# Patient Record
Sex: FEMALE | Race: WHITE | NOT HISPANIC OR LATINO | Employment: OTHER | URBAN - METROPOLITAN AREA
[De-identification: names, ages, dates, MRNs, and addresses within clinical notes are randomized per-mention and may not be internally consistent; named-entity substitution may affect disease eponyms.]

---

## 2023-05-18 DIAGNOSIS — I10 HYPERTENSION, UNSPECIFIED TYPE: ICD-10-CM

## 2023-05-18 RX ORDER — ROSUVASTATIN CALCIUM 40 MG/1
1 TABLET, COATED ORAL NIGHTLY
COMMUNITY
Start: 2020-07-13 | End: 2023-05-18 | Stop reason: SDUPTHER

## 2023-05-18 RX ORDER — SPIRONOLACTONE 25 MG/1
1 TABLET ORAL DAILY
COMMUNITY
Start: 2022-02-10 | End: 2023-05-18 | Stop reason: SDUPTHER

## 2023-05-18 RX ORDER — ROSUVASTATIN CALCIUM 40 MG/1
40 TABLET, COATED ORAL NIGHTLY
Qty: 30 TABLET | Refills: 0 | Status: SHIPPED | OUTPATIENT
Start: 2023-05-18

## 2023-05-18 RX ORDER — SPIRONOLACTONE 25 MG/1
25 TABLET ORAL DAILY
Qty: 30 TABLET | Refills: 0 | Status: SHIPPED | OUTPATIENT
Start: 2023-05-18

## 2023-06-17 LAB
ALANINE AMINOTRANSFERASE (SGPT) (U/L) IN SER/PLAS: 14 U/L (ref 7–45)
ALBUMIN (G/DL) IN SER/PLAS: 3.9 G/DL (ref 3.4–5)
ALKALINE PHOSPHATASE (U/L) IN SER/PLAS: 92 U/L (ref 33–136)
ANION GAP IN SER/PLAS: 13 MMOL/L (ref 10–20)
ASPARTATE AMINOTRANSFERASE (SGOT) (U/L) IN SER/PLAS: 18 U/L (ref 9–39)
BILIRUBIN TOTAL (MG/DL) IN SER/PLAS: 0.5 MG/DL (ref 0–1.2)
CALCIUM (MG/DL) IN SER/PLAS: 9.8 MG/DL (ref 8.6–10.6)
CARBON DIOXIDE, TOTAL (MMOL/L) IN SER/PLAS: 28 MMOL/L (ref 21–32)
CHLORIDE (MMOL/L) IN SER/PLAS: 108 MMOL/L (ref 98–107)
CHOLESTEROL (MG/DL) IN SER/PLAS: 194 MG/DL (ref 0–199)
CHOLESTEROL IN HDL (MG/DL) IN SER/PLAS: 49.1 MG/DL
CHOLESTEROL/HDL RATIO: 4
CREATININE (MG/DL) IN SER/PLAS: 0.94 MG/DL (ref 0.5–1.05)
ERYTHROCYTE DISTRIBUTION WIDTH (RATIO) BY AUTOMATED COUNT: 12.8 % (ref 11.5–14.5)
ERYTHROCYTE MEAN CORPUSCULAR HEMOGLOBIN CONCENTRATION (G/DL) BY AUTOMATED: 31.1 G/DL (ref 32–36)
ERYTHROCYTE MEAN CORPUSCULAR VOLUME (FL) BY AUTOMATED COUNT: 95 FL (ref 80–100)
ERYTHROCYTES (10*6/UL) IN BLOOD BY AUTOMATED COUNT: 4.05 X10E12/L (ref 4–5.2)
GFR FEMALE: 64 ML/MIN/1.73M2
GLUCOSE (MG/DL) IN SER/PLAS: 95 MG/DL (ref 74–99)
HEMATOCRIT (%) IN BLOOD BY AUTOMATED COUNT: 38.3 % (ref 36–46)
HEMOGLOBIN (G/DL) IN BLOOD: 11.9 G/DL (ref 12–16)
LDL: 125 MG/DL (ref 0–99)
LEUKOCYTES (10*3/UL) IN BLOOD BY AUTOMATED COUNT: 5.7 X10E9/L (ref 4.4–11.3)
NRBC (PER 100 WBCS) BY AUTOMATED COUNT: 0 /100 WBC (ref 0–0)
PLATELETS (10*3/UL) IN BLOOD AUTOMATED COUNT: 287 X10E9/L (ref 150–450)
POTASSIUM (MMOL/L) IN SER/PLAS: 5.6 MMOL/L (ref 3.5–5.3)
PROTEIN TOTAL: 6.8 G/DL (ref 6.4–8.2)
SODIUM (MMOL/L) IN SER/PLAS: 143 MMOL/L (ref 136–145)
THYROTROPIN (MIU/L) IN SER/PLAS BY DETECTION LIMIT <= 0.05 MIU/L: 3.98 MIU/L (ref 0.44–3.98)
TRIGLYCERIDE (MG/DL) IN SER/PLAS: 102 MG/DL (ref 0–149)
UREA NITROGEN (MG/DL) IN SER/PLAS: 18 MG/DL (ref 6–23)
VLDL: 20 MG/DL (ref 0–40)

## 2023-06-27 ENCOUNTER — TELEPHONE (OUTPATIENT)
Dept: PRIMARY CARE | Facility: CLINIC | Age: 73
End: 2023-06-27
Payer: MEDICARE

## 2023-06-27 NOTE — TELEPHONE ENCOUNTER
Please inform patient that we have a new provider joining our practice, that will be seeing Dr. Valerio's former patients.   Please see if patient would like to schedule an appointment with Dr. Kerns.   Let me know, Thank you!

## 2023-07-18 LAB
ALANINE AMINOTRANSFERASE (SGPT) (U/L) IN SER/PLAS: 14 U/L (ref 7–45)
ALBUMIN (G/DL) IN SER/PLAS: 3.9 G/DL (ref 3.4–5)
ALKALINE PHOSPHATASE (U/L) IN SER/PLAS: 107 U/L (ref 33–136)
ANION GAP IN SER/PLAS: 13 MMOL/L (ref 10–20)
ASPARTATE AMINOTRANSFERASE (SGOT) (U/L) IN SER/PLAS: 19 U/L (ref 9–39)
BILIRUBIN TOTAL (MG/DL) IN SER/PLAS: 0.7 MG/DL (ref 0–1.2)
CALCIUM (MG/DL) IN SER/PLAS: 9.7 MG/DL (ref 8.6–10.6)
CARBON DIOXIDE, TOTAL (MMOL/L) IN SER/PLAS: 28 MMOL/L (ref 21–32)
CHLORIDE (MMOL/L) IN SER/PLAS: 106 MMOL/L (ref 98–107)
CHOLESTEROL (MG/DL) IN SER/PLAS: 120 MG/DL (ref 0–199)
CHOLESTEROL IN HDL (MG/DL) IN SER/PLAS: 54.5 MG/DL
CHOLESTEROL/HDL RATIO: 2.2
CREATININE (MG/DL) IN SER/PLAS: 0.88 MG/DL (ref 0.5–1.05)
GFR FEMALE: 70 ML/MIN/1.73M2
GLUCOSE (MG/DL) IN SER/PLAS: 88 MG/DL (ref 74–99)
LDL: 44 MG/DL (ref 0–99)
POTASSIUM (MMOL/L) IN SER/PLAS: 5.1 MMOL/L (ref 3.5–5.3)
PROTEIN TOTAL: 6.9 G/DL (ref 6.4–8.2)
SODIUM (MMOL/L) IN SER/PLAS: 142 MMOL/L (ref 136–145)
TRIGLYCERIDE (MG/DL) IN SER/PLAS: 106 MG/DL (ref 0–149)
UREA NITROGEN (MG/DL) IN SER/PLAS: 18 MG/DL (ref 6–23)
VLDL: 21 MG/DL (ref 0–40)

## 2023-09-13 PROBLEM — N81.10 FEMALE CYSTOCELE: Status: ACTIVE | Noted: 2023-09-13

## 2023-09-13 PROBLEM — M51.34 THORACIC DEGENERATIVE DISC DISEASE: Status: ACTIVE | Noted: 2023-09-13

## 2023-09-13 PROBLEM — M85.80 OSTEOPENIA: Status: ACTIVE | Noted: 2023-09-13

## 2023-09-13 PROBLEM — M79.89 SWELLING OF LEFT LOWER EXTREMITY: Status: ACTIVE | Noted: 2023-09-13

## 2023-09-13 PROBLEM — H02.9 LESION OF RIGHT EYELID: Status: ACTIVE | Noted: 2023-09-13

## 2023-09-13 PROBLEM — N83.299 OVARIAN CYST, COMPLEX: Status: ACTIVE | Noted: 2023-09-13

## 2023-09-13 PROBLEM — M35.01 BILATERAL KERATITIS SICCA (MULTI): Status: ACTIVE | Noted: 2023-09-13

## 2023-09-13 PROBLEM — R06.09 DYSPNEA ON EXERTION: Status: ACTIVE | Noted: 2023-09-13

## 2023-09-13 PROBLEM — H25.11 AGE-RELATED NUCLEAR CATARACT OF RIGHT EYE: Status: ACTIVE | Noted: 2023-09-13

## 2023-09-13 PROBLEM — J98.6 DIAPHRAGMATIC PARALYSIS: Status: ACTIVE | Noted: 2023-09-13

## 2023-09-13 PROBLEM — R07.9 CHEST PAIN: Status: ACTIVE | Noted: 2023-09-13

## 2023-09-13 PROBLEM — H01.123: Status: ACTIVE | Noted: 2023-09-13

## 2023-09-13 PROBLEM — G56.02 LEFT CARPAL TUNNEL SYNDROME: Status: ACTIVE | Noted: 2023-09-13

## 2023-09-13 PROBLEM — I89.0 LYMPHEDEMA OF BOTH LOWER EXTREMITIES: Status: ACTIVE | Noted: 2023-09-13

## 2023-09-13 PROBLEM — M19.041 PRIMARY OSTEOARTHRITIS, RIGHT HAND: Status: ACTIVE | Noted: 2023-09-13

## 2023-09-13 PROBLEM — R55 SYNCOPE: Status: ACTIVE | Noted: 2023-09-13

## 2023-09-13 PROBLEM — K22.4 ESOPHAGEAL SPASM: Status: ACTIVE | Noted: 2023-09-13

## 2023-09-13 PROBLEM — I50.33 ACUTE ON CHRONIC DIASTOLIC HEART FAILURE (MULTI): Status: ACTIVE | Noted: 2023-09-13

## 2023-09-13 PROBLEM — E66.2 OBESITY HYPOVENTILATION SYNDROME (MULTI): Status: ACTIVE | Noted: 2023-09-13

## 2023-09-13 PROBLEM — K21.9 ESOPHAGEAL REFLUX: Status: ACTIVE | Noted: 2023-09-13

## 2023-09-13 PROBLEM — I51.89 DIASTOLIC DYSFUNCTION: Status: ACTIVE | Noted: 2023-09-13

## 2023-09-13 PROBLEM — Z94.7: Status: ACTIVE | Noted: 2023-09-13

## 2023-09-13 PROBLEM — G47.00 INSOMNIA: Status: ACTIVE | Noted: 2023-09-13

## 2023-09-13 PROBLEM — E55.9 VITAMIN D DEFICIENCY: Status: ACTIVE | Noted: 2023-09-13

## 2023-09-13 PROBLEM — J45.909 ASTHMA (HHS-HCC): Status: ACTIVE | Noted: 2023-09-13

## 2023-09-13 PROBLEM — M47.816 SPONDYLOSIS OF LUMBAR SPINE: Status: ACTIVE | Noted: 2023-09-13

## 2023-09-13 PROBLEM — N18.9 CHRONIC RENAL INSUFFICIENCY: Status: ACTIVE | Noted: 2023-09-13

## 2023-09-13 PROBLEM — H43.393 VITREOUS FLOATERS OF BOTH EYES: Status: ACTIVE | Noted: 2023-09-13

## 2023-09-13 PROBLEM — G56.01 RIGHT CARPAL TUNNEL SYNDROME: Status: ACTIVE | Noted: 2023-09-13

## 2023-09-13 PROBLEM — S33.5XXA LUMBAR BACK SPRAIN: Status: ACTIVE | Noted: 2023-09-13

## 2023-09-13 PROBLEM — N18.30 CHRONIC RENAL IMPAIRMENT, STAGE 3 (MODERATE) (MULTI): Status: ACTIVE | Noted: 2023-09-13

## 2023-09-13 PROBLEM — M19.90 ARTHRITIS: Status: ACTIVE | Noted: 2023-09-13

## 2023-09-13 PROBLEM — I77.819 AORTIC DILATATION (CMS-HCC): Status: ACTIVE | Noted: 2023-09-13

## 2023-09-13 PROBLEM — R39.11 URINARY HESITANCY: Status: ACTIVE | Noted: 2023-09-13

## 2023-09-13 PROBLEM — M71.22 SYNOVIAL CYST OF LEFT KNEE: Status: ACTIVE | Noted: 2023-09-13

## 2023-09-13 PROBLEM — F33.41 RECURRENT MAJOR DEPRESSIVE DISORDER, IN PARTIAL REMISSION (CMS-HCC): Status: ACTIVE | Noted: 2023-09-13

## 2023-09-13 PROBLEM — L82.1 SEBORRHEIC KERATOSES: Status: ACTIVE | Noted: 2023-09-13

## 2023-09-13 PROBLEM — K63.5 COLON POLYP: Status: ACTIVE | Noted: 2023-09-13

## 2023-09-13 PROBLEM — N17.9 ACUTE KIDNEY INJURY (CMS-HCC): Status: ACTIVE | Noted: 2023-09-13

## 2023-09-13 PROBLEM — F41.8 DEPRESSION WITH ANXIETY: Status: ACTIVE | Noted: 2023-09-13

## 2023-09-13 PROBLEM — R63.4 WEIGHT LOSS: Status: ACTIVE | Noted: 2023-09-13

## 2023-09-13 PROBLEM — H16.9 KERATITIS: Status: ACTIVE | Noted: 2023-09-13

## 2023-09-13 PROBLEM — D17.9 LIPOMA: Status: ACTIVE | Noted: 2023-09-13

## 2023-09-13 PROBLEM — R42 VERTIGO: Status: ACTIVE | Noted: 2023-09-13

## 2023-09-13 PROBLEM — M10.9 GOUT: Status: ACTIVE | Noted: 2023-09-13

## 2023-09-13 PROBLEM — R60.9 EDEMA: Status: ACTIVE | Noted: 2023-09-13

## 2023-09-13 PROBLEM — G47.33 OBSTRUCTIVE SLEEP APNEA: Status: ACTIVE | Noted: 2023-09-13

## 2023-09-13 PROBLEM — L30.9 ECZEMA: Status: ACTIVE | Noted: 2023-09-13

## 2023-09-13 PROBLEM — M17.12 OSTEOARTHRITIS OF LEFT KNEE: Status: ACTIVE | Noted: 2023-09-13

## 2023-09-13 PROBLEM — N81.2 SECOND DEGREE UTERINE PROLAPSE: Status: ACTIVE | Noted: 2023-09-13

## 2023-09-13 PROBLEM — M19.042 OSTEOARTHRITIS OF LEFT HAND: Status: ACTIVE | Noted: 2023-09-13

## 2023-09-13 PROBLEM — E83.52 HYPERCALCEMIA: Status: ACTIVE | Noted: 2023-09-13

## 2023-09-13 PROBLEM — E78.5 DYSLIPIDEMIA: Status: ACTIVE | Noted: 2023-09-13

## 2023-09-13 PROBLEM — M17.11 OSTEOARTHRITIS OF RIGHT KNEE: Status: ACTIVE | Noted: 2023-09-13

## 2023-09-13 PROBLEM — M70.70 BURSITIS OF HIP: Status: ACTIVE | Noted: 2023-09-13

## 2023-09-13 PROBLEM — M51.36 DEGENERATION OF INTERVERTEBRAL DISC OF LUMBAR REGION: Status: ACTIVE | Noted: 2023-09-13

## 2023-09-13 PROBLEM — E16.2 HYPOGLYCEMIA: Status: ACTIVE | Noted: 2023-09-13

## 2023-09-13 PROBLEM — H81.10 BPPV (BENIGN PAROXYSMAL POSITIONAL VERTIGO): Status: ACTIVE | Noted: 2023-09-13

## 2023-09-13 PROBLEM — M51.369 DEGENERATION OF INTERVERTEBRAL DISC OF LUMBAR REGION: Status: ACTIVE | Noted: 2023-09-13

## 2023-09-13 PROBLEM — H16.223 BILATERAL KERATITIS SICCA: Status: ACTIVE | Noted: 2023-09-13

## 2023-09-13 PROBLEM — E78.5 HYPERLIPIDEMIA: Status: ACTIVE | Noted: 2023-09-13

## 2023-09-13 PROBLEM — H53.10 SUBJECTIVE VISUAL DISTURBANCE OF LEFT EYE: Status: ACTIVE | Noted: 2023-09-13

## 2023-09-13 PROBLEM — K92.1 HEMATOCHEZIA: Status: ACTIVE | Noted: 2023-09-13

## 2023-09-13 PROBLEM — I10 ESSENTIAL HYPERTENSION: Status: ACTIVE | Noted: 2023-09-13

## 2023-09-13 PROBLEM — K64.9 HEMORRHOIDS: Status: ACTIVE | Noted: 2023-09-13

## 2023-09-13 PROBLEM — E87.5 HYPERKALEMIA: Status: ACTIVE | Noted: 2023-09-13

## 2023-09-13 PROBLEM — B02.9 HERPES ZOSTER: Status: ACTIVE | Noted: 2023-09-13

## 2023-09-13 PROBLEM — I20.9 ANGINA PECTORIS (CMS-HCC): Status: ACTIVE | Noted: 2023-09-13

## 2023-09-13 PROBLEM — E66.01 MORBID OBESITY (MULTI): Status: ACTIVE | Noted: 2023-09-13

## 2023-09-13 PROBLEM — R05.3 CHRONIC COUGH: Status: ACTIVE | Noted: 2023-09-13

## 2023-09-13 PROBLEM — E21.0 PRIMARY HYPERPARATHYROIDISM (MULTI): Status: ACTIVE | Noted: 2023-09-13

## 2023-09-13 PROBLEM — M70.60 GREATER TROCHANTERIC BURSITIS: Status: ACTIVE | Noted: 2023-09-13

## 2023-09-13 PROBLEM — D31.31 CHOROIDAL NEVUS, RIGHT EYE: Status: ACTIVE | Noted: 2023-09-13

## 2023-09-13 RX ORDER — ASPIRIN 81 MG/1
TABLET ORAL
COMMUNITY
Start: 2022-11-14

## 2023-09-13 RX ORDER — ACETAMINOPHEN 500 MG
1 TABLET ORAL EVERY 6 HOURS
COMMUNITY

## 2023-09-13 RX ORDER — ALBUTEROL SULFATE 90 UG/1
2 AEROSOL, METERED RESPIRATORY (INHALATION) EVERY 4 HOURS PRN
COMMUNITY
Start: 2023-01-23

## 2023-09-13 RX ORDER — FUROSEMIDE 40 MG/1
1 TABLET ORAL EVERY OTHER DAY
COMMUNITY

## 2023-09-13 RX ORDER — PRAVASTATIN SODIUM 40 MG/1
1 TABLET ORAL DAILY
COMMUNITY

## 2023-09-13 RX ORDER — LOSARTAN POTASSIUM 100 MG/1
1 TABLET ORAL DAILY
COMMUNITY
Start: 2020-02-03

## 2023-09-13 RX ORDER — CHLORHEXIDINE GLUCONATE ORAL RINSE 1.2 MG/ML
SOLUTION DENTAL
COMMUNITY
Start: 2022-08-18

## 2023-09-13 RX ORDER — FUROSEMIDE 20 MG/1
1 TABLET ORAL DAILY
COMMUNITY
Start: 2012-06-19

## 2023-09-13 RX ORDER — MELATONIN 10 MG
1 CAPSULE ORAL NIGHTLY
COMMUNITY

## 2023-09-13 RX ORDER — FLUTICASONE PROPIONATE AND SALMETEROL 50; 250 UG/1; UG/1
1 POWDER RESPIRATORY (INHALATION) EVERY 12 HOURS
COMMUNITY
Start: 2022-11-14

## 2023-09-13 RX ORDER — AMLODIPINE BESYLATE 10 MG/1
1 TABLET ORAL DAILY
COMMUNITY

## 2023-09-13 RX ORDER — NYSTATIN 100000 [USP'U]/G
1 POWDER TOPICAL 3 TIMES DAILY
COMMUNITY
Start: 2023-03-02

## 2023-09-13 RX ORDER — NITROGLYCERIN 0.4 MG/1
TABLET SUBLINGUAL DAILY
COMMUNITY
Start: 2022-11-02 | End: 2024-02-16 | Stop reason: SDUPTHER

## 2023-09-13 RX ORDER — FLUTICASONE PROPIONATE 50 MCG
SPRAY, SUSPENSION (ML) NASAL
COMMUNITY
Start: 2023-06-15

## 2023-09-13 RX ORDER — AMLODIPINE BESYLATE 5 MG/1
1 TABLET ORAL DAILY
COMMUNITY

## 2023-09-23 LAB
ALANINE AMINOTRANSFERASE (SGPT) (U/L) IN SER/PLAS: 15 U/L (ref 7–45)
ALBUMIN (G/DL) IN SER/PLAS: 3.9 G/DL (ref 3.4–5)
ALKALINE PHOSPHATASE (U/L) IN SER/PLAS: 95 U/L (ref 33–136)
ANION GAP IN SER/PLAS: 13 MMOL/L (ref 10–20)
ASPARTATE AMINOTRANSFERASE (SGOT) (U/L) IN SER/PLAS: 17 U/L (ref 9–39)
BILIRUBIN TOTAL (MG/DL) IN SER/PLAS: 0.5 MG/DL (ref 0–1.2)
CALCIUM (MG/DL) IN SER/PLAS: 9.5 MG/DL (ref 8.6–10.6)
CARBON DIOXIDE, TOTAL (MMOL/L) IN SER/PLAS: 27 MMOL/L (ref 21–32)
CHLORIDE (MMOL/L) IN SER/PLAS: 108 MMOL/L (ref 98–107)
CHOLESTEROL (MG/DL) IN SER/PLAS: 166 MG/DL (ref 0–199)
CHOLESTEROL IN HDL (MG/DL) IN SER/PLAS: 68.1 MG/DL
CHOLESTEROL/HDL RATIO: 2.4
CREATININE (MG/DL) IN SER/PLAS: 0.88 MG/DL (ref 0.5–1.05)
GFR FEMALE: 69 ML/MIN/1.73M2
GLUCOSE (MG/DL) IN SER/PLAS: 89 MG/DL (ref 74–99)
LDL: 86 MG/DL (ref 0–99)
POTASSIUM (MMOL/L) IN SER/PLAS: 5.4 MMOL/L (ref 3.5–5.3)
PROTEIN TOTAL: 6.7 G/DL (ref 6.4–8.2)
SODIUM (MMOL/L) IN SER/PLAS: 143 MMOL/L (ref 136–145)
TRIGLYCERIDE (MG/DL) IN SER/PLAS: 58 MG/DL (ref 0–149)
UREA NITROGEN (MG/DL) IN SER/PLAS: 25 MG/DL (ref 6–23)
VLDL: 12 MG/DL (ref 0–40)

## 2023-09-28 ENCOUNTER — HOSPITAL ENCOUNTER (OUTPATIENT)
Dept: DATA CONVERSION | Facility: HOSPITAL | Age: 73
End: 2023-09-28

## 2023-09-28 DIAGNOSIS — I89.0 LYMPHEDEMA OF BOTH LOWER EXTREMITIES: Primary | ICD-10-CM

## 2023-09-28 DIAGNOSIS — Z12.31 ENCOUNTER FOR SCREENING MAMMOGRAM FOR MALIGNANT NEOPLASM OF BREAST: ICD-10-CM

## 2023-09-28 DIAGNOSIS — N83.8 OTHER NONINFLAMMATORY DISORDERS OF OVARY, FALLOPIAN TUBE AND BROAD LIGAMENT: ICD-10-CM

## 2023-09-29 ENCOUNTER — HOSPITAL ENCOUNTER (OUTPATIENT)
Dept: DATA CONVERSION | Facility: HOSPITAL | Age: 73
Discharge: HOME | End: 2023-09-29
Payer: MEDICARE

## 2023-09-29 DIAGNOSIS — N83.8 OTHER NONINFLAMMATORY DISORDERS OF OVARY, FALLOPIAN TUBE AND BROAD LIGAMENT: ICD-10-CM

## 2023-09-29 DIAGNOSIS — Z12.31 ENCOUNTER FOR SCREENING MAMMOGRAM FOR MALIGNANT NEOPLASM OF BREAST: ICD-10-CM

## 2023-11-21 ENCOUNTER — OFFICE VISIT (OUTPATIENT)
Dept: SURGERY | Facility: CLINIC | Age: 73
End: 2023-11-21
Payer: MEDICARE

## 2023-11-21 VITALS
WEIGHT: 251 LBS | TEMPERATURE: 97.7 F | BODY MASS INDEX: 45.91 KG/M2 | HEART RATE: 72 BPM | DIASTOLIC BLOOD PRESSURE: 88 MMHG | SYSTOLIC BLOOD PRESSURE: 140 MMHG

## 2023-11-21 DIAGNOSIS — J98.6 DIAPHRAGM DYSFUNCTION: Primary | ICD-10-CM

## 2023-11-21 DIAGNOSIS — J98.6 DIAPHRAGMATIC PARALYSIS: Primary | ICD-10-CM

## 2023-11-21 PROCEDURE — 99215 OFFICE O/P EST HI 40 MIN: CPT | Performed by: NURSE PRACTITIONER

## 2023-11-21 PROCEDURE — 1126F AMNT PAIN NOTED NONE PRSNT: CPT | Performed by: NURSE PRACTITIONER

## 2023-11-21 PROCEDURE — 3077F SYST BP >= 140 MM HG: CPT | Performed by: NURSE PRACTITIONER

## 2023-11-21 PROCEDURE — 1036F TOBACCO NON-USER: CPT | Performed by: NURSE PRACTITIONER

## 2023-11-21 PROCEDURE — 1159F MED LIST DOCD IN RCRD: CPT | Performed by: NURSE PRACTITIONER

## 2023-11-21 PROCEDURE — 3079F DIAST BP 80-89 MM HG: CPT | Performed by: NURSE PRACTITIONER

## 2023-11-21 ASSESSMENT — PAIN SCALES - GENERAL: PAINLEVEL: 0-NO PAIN

## 2023-11-21 NOTE — PROGRESS NOTES
Subjective   Patient ID: Ondina Simmons is a 72 y.o. female who presents for pacer alarming.  No chief complaint on file..  HPI Ondina has been pacing for 1 year 7 months. To date, she continues to be short of breath. She was diagnosed with COPD and has been started on Trelegy. She says there are good days and bad days. She reports the pacer began beeping and called yesterday for repair.     Review of Systems    Objective   Physical Exam  Constitutional:       Appearance: Normal appearance. She is obese.   Pulmonary:      Effort: Pulmonary effort is normal.   Abdominal:      Comments: Wire sites intact. Skin at exit site looks slightly dry and slightly red but no drainage, skin soft,    Musculoskeletal:         General: Normal range of motion.      Cervical back: Normal range of motion.   Neurological:      Mental Status: She is alert.   Psychiatric:         Mood and Affect: Mood normal.         Assessment/Plan     72 year old who has paralyzed elevated right HD, shortness of breath who came for repair. We identified the L1 is broken. The L2 broke a while back and this electrode has been off for while. We did repair the electrodes in the usual fashion. The electrodes were on the long side so I opted to place all 5 in new block. All 5 electrodes were working well. We did some pacer adjustments. She was comfortable at end. We do plan a follow up in April at the 2 year desire and repeat SNIFF.

## 2024-01-17 ENCOUNTER — TELEPHONE (OUTPATIENT)
Dept: PRIMARY CARE | Facility: CLINIC | Age: 74
End: 2024-01-17
Payer: MEDICARE

## 2024-01-17 DIAGNOSIS — I10 ESSENTIAL HYPERTENSION: Primary | ICD-10-CM

## 2024-01-17 NOTE — TELEPHONE ENCOUNTER
Pt called in stating she is due for a refill of Lisinopril-hydrochlorothiazide. Pt wants PCP to be aware her systolic pressure has been good and ranges between 112-120. But her diastolic pressure has been ranging between 80-95. Pt wants to know if she should be concerned about the diastolic pressure and if the dose of her rx needs to be adjusted. Pt states she would like the refill sent to [facility]. Please advise. Pt last seen 7/19/23 for BP follow up.

## 2024-01-18 RX ORDER — LISINOPRIL AND HYDROCHLOROTHIAZIDE 20; 25 MG/1; MG/1
1 TABLET ORAL DAILY
Qty: 90 TABLET | Refills: 0 | Status: SHIPPED | OUTPATIENT
Start: 2024-01-18 | End: 2024-04-17

## 2024-01-18 NOTE — TELEPHONE ENCOUNTER
Patient was last seen in July for medication follow-up, due for her 6-month medication follow-up at this time.  She will be due for her wellness check 6/16/2024.  Please schedule each of those appointments for her.  We have checked the calibration on her blood pressure machine before which reads accurately so we can take the numbers the machine is providing at face value.  The numbers are not far off for her and are pretty decent for her age.  For now I would have her remain on her current dose of the medication and have her bring her full list of numbers to her medicine follow-up visit for closer review.  I will send her a 90-day refill.

## 2024-01-23 NOTE — TELEPHONE ENCOUNTER
Pt notified, will call back to schedule 6 month follow up once she gets back home and looks at her calendar.

## 2024-02-01 PROBLEM — H43.393 VITREOUS SYNERESIS OF BOTH EYES: Status: ACTIVE | Noted: 2017-11-17

## 2024-02-01 PROBLEM — H31.102: Status: ACTIVE | Noted: 2017-11-17

## 2024-02-01 PROBLEM — Z96.1 PSEUDOPHAKIA: Status: ACTIVE | Noted: 2017-11-17

## 2024-02-01 PROBLEM — R60.0 LIPEDEMA OF LOWER EXTREMITY: Status: ACTIVE | Noted: 2024-02-01

## 2024-02-01 PROBLEM — M79.643 HAND PAIN: Status: ACTIVE | Noted: 2024-02-01

## 2024-02-01 PROBLEM — S05.62XS: Status: ACTIVE | Noted: 2017-11-17

## 2024-02-01 PROBLEM — H43.811 POSTERIOR VITREOUS DETACHMENT OF RIGHT EYE: Status: ACTIVE | Noted: 2017-11-17

## 2024-02-01 RX ORDER — NAPROXEN 500 MG/1
TABLET ORAL
COMMUNITY
Start: 2023-10-28

## 2024-02-01 RX ORDER — FLUTICASONE FUROATE, UMECLIDINIUM BROMIDE AND VILANTEROL TRIFENATATE 100; 62.5; 25 UG/1; UG/1; UG/1
POWDER RESPIRATORY (INHALATION)
COMMUNITY
Start: 2023-10-06

## 2024-02-01 RX ORDER — AMOXICILLIN AND CLAVULANATE POTASSIUM 875; 125 MG/1; MG/1
1 TABLET, FILM COATED ORAL 2 TIMES DAILY
COMMUNITY
Start: 2024-01-03 | End: 2024-01-10

## 2024-02-01 RX ORDER — LISINOPRIL 30 MG/1
TABLET ORAL EVERY 24 HOURS
COMMUNITY
Start: 2023-08-23

## 2024-02-15 ENCOUNTER — APPOINTMENT (OUTPATIENT)
Dept: PRIMARY CARE | Facility: CLINIC | Age: 74
End: 2024-02-15
Payer: MEDICARE

## 2024-02-16 ENCOUNTER — OFFICE VISIT (OUTPATIENT)
Dept: CARDIOLOGY | Facility: CLINIC | Age: 74
End: 2024-02-16
Payer: MEDICARE

## 2024-02-16 VITALS
WEIGHT: 250 LBS | HEART RATE: 72 BPM | HEIGHT: 63 IN | TEMPERATURE: 98.9 F | BODY MASS INDEX: 44.3 KG/M2 | RESPIRATION RATE: 18 BRPM | SYSTOLIC BLOOD PRESSURE: 126 MMHG | DIASTOLIC BLOOD PRESSURE: 90 MMHG

## 2024-02-16 DIAGNOSIS — R07.9 CHEST PAIN, UNSPECIFIED TYPE: ICD-10-CM

## 2024-02-16 DIAGNOSIS — I50.33 ACUTE ON CHRONIC DIASTOLIC HEART FAILURE (MULTI): Primary | ICD-10-CM

## 2024-02-16 DIAGNOSIS — E78.5 DYSLIPIDEMIA: ICD-10-CM

## 2024-02-16 DIAGNOSIS — I10 ESSENTIAL HYPERTENSION: ICD-10-CM

## 2024-02-16 DIAGNOSIS — I51.89 DIASTOLIC DYSFUNCTION: ICD-10-CM

## 2024-02-16 DIAGNOSIS — I10 PRIMARY HYPERTENSION: ICD-10-CM

## 2024-02-16 PROCEDURE — 1159F MED LIST DOCD IN RCRD: CPT | Performed by: INTERNAL MEDICINE

## 2024-02-16 PROCEDURE — 3074F SYST BP LT 130 MM HG: CPT | Performed by: INTERNAL MEDICINE

## 2024-02-16 PROCEDURE — 3080F DIAST BP >= 90 MM HG: CPT | Performed by: INTERNAL MEDICINE

## 2024-02-16 PROCEDURE — 1036F TOBACCO NON-USER: CPT | Performed by: INTERNAL MEDICINE

## 2024-02-16 PROCEDURE — 1126F AMNT PAIN NOTED NONE PRSNT: CPT | Performed by: INTERNAL MEDICINE

## 2024-02-16 PROCEDURE — 99214 OFFICE O/P EST MOD 30 MIN: CPT | Performed by: INTERNAL MEDICINE

## 2024-02-16 RX ORDER — HYDRALAZINE HYDROCHLORIDE 25 MG/1
25 TABLET, FILM COATED ORAL 2 TIMES DAILY
Qty: 60 TABLET | Refills: 11 | Status: SHIPPED | OUTPATIENT
Start: 2024-02-16 | End: 2025-02-15

## 2024-02-16 RX ORDER — NITROGLYCERIN 0.4 MG/1
0.4 TABLET SUBLINGUAL EVERY 5 MIN PRN
Qty: 90 TABLET | Refills: 3 | Status: SHIPPED | OUTPATIENT
Start: 2024-02-16 | End: 2025-02-10

## 2024-02-16 ASSESSMENT — PAIN SCALES - GENERAL: PAINLEVEL: 0-NO PAIN

## 2024-02-16 ASSESSMENT — PATIENT HEALTH QUESTIONNAIRE - PHQ9
2. FEELING DOWN, DEPRESSED OR HOPELESS: NOT AT ALL
1. LITTLE INTEREST OR PLEASURE IN DOING THINGS: NOT AT ALL
SUM OF ALL RESPONSES TO PHQ9 QUESTIONS 1 AND 2: 0

## 2024-02-16 ASSESSMENT — ENCOUNTER SYMPTOMS
LOSS OF SENSATION IN FEET: 0
OCCASIONAL FEELINGS OF UNSTEADINESS: 0
DEPRESSION: 0

## 2024-02-16 NOTE — ADDENDUM NOTE
Addended by: YVETTE TAVERA on: 2/16/2024 02:04 PM     Modules accepted: Orders, Level of Service

## 2024-02-16 NOTE — PROGRESS NOTES
History of present illness:  This is a very pleasant 73-year-old female with history of left diaphragmatic paralysis, previous history of shortness of breath and chest pain with activity. 2 D echo in 2019 showed normal ejection fraction mild mitral regurgitation. Patient had a cardiac catheterization in 2020 showed no significant coronary artery disease. Patient had a stress test in December 2022 showed no ischemia back then it was workup for jaw pain. Has not been having any jaw pain.  Patient feeling overall good.  Her breathing has improved after her diaphragm pacemaker and 3 oh.  Still having chronic lymphedema.  Denies having chest pain palpitations dizziness or syncope.  Past Medical History:   Diagnosis Date    Acute on chronic diastolic heart failure (CMS/HCC) 09/13/2023    Angina pectoris (CMS/HCC) 09/13/2023    Aortic dilatation (CMS/HCC) 09/13/2023    Chest pain 09/13/2023    Diastolic dysfunction 09/13/2023    Dyslipidemia 09/13/2023    Dyspnea on exertion 09/13/2023    Encounter for gynecological examination (general) (routine) without abnormal findings     Encounter for routine pelvic examination    Encounter for screening mammogram for malignant neoplasm of breast     Visit for screening mammogram    Essential hypertension 09/13/2023    Hyperlipidemia 09/13/2023    Hyperlipidemia, unspecified 04/12/2018    Hyperlipidemia    Hyperlipidemia, unspecified     Dyslipidemia    Morbid (severe) obesity due to excess calories (CMS/HCC)     Morbid obesity    Other conditions influencing health status     Osteoarthritis    Personal history of other diseases of the circulatory system     History of essential hypertension    Personal history of other diseases of the digestive system     History of esophageal reflux       Past Surgical History:   Procedure Laterality Date    COLONOSCOPY  06/07/2013    Complete Colonoscopy    OTHER SURGICAL HISTORY  02/10/2014    Cornea Transplant       Allergies   Allergen  Reactions    Ace Inhibitors Cough    Amlodipine Besylate Respiratory depression    Losartan Unknown        reports that she has never smoked. She has never been exposed to tobacco smoke. She has never used smokeless tobacco. She reports current alcohol use of about 1.0 standard drink of alcohol per week. She reports that she does not use drugs.    Family History   Problem Relation Name Age of Onset    Diabetes Mother      Heart disease Mother      Cancer Father      Cancer Sister      Breast cancer Mother's Sister      Breast cancer Other family history     Diabetes Other family history     Heart disease Other family history     Other (htn) Other family history        Patient's Medications   New Prescriptions    No medications on file   Previous Medications    ACETAMINOPHEN (TYLENOL EXTRA STRENGTH) 500 MG TABLET    Take 1 tablet (500 mg) by mouth every 6 hours.    ADVAIR DISKUS 250-50 MCG/DOSE DISKUS INHALER    Inhale 1 puff every 12 hours.  RINSE AFTER EACH USE    ALBUTEROL 90 MCG/ACTUATION INHALER    Inhale 2 puffs every 4 hours if needed for shortness of breath.    AMLODIPINE (NORVASC) 10 MG TABLET    Take 1 tablet (10 mg) by mouth once daily.    AMLODIPINE (NORVASC) 5 MG TABLET    Take 1 tablet (5 mg) by mouth once daily.    ASPIRIN 81 MG EC TABLET    Aspirin EC 81 MG TBEC  Refills: 0 MD Te [facility] Start: 14-Nov-2022    CHLORHEXIDINE (PERIDEX) 0.12 % SOLUTION    Take by mouth. RINSE MOUTH WITH 1/2 OUNCE TWICE DAILY AFTER BREAKFAST AND BEFORE BEDTIME FOLLOWING BRUSHING AND FLOSSING    FLUTICASONE (FLONASE) 50 MCG/ACTUATION NASAL SPRAY        FUROSEMIDE (LASIX) 20 MG TABLET    Take 1 tablet (20 mg) by mouth once daily.    FUROSEMIDE (LASIX) 40 MG TABLET    Take 1 tablet (40 mg) by mouth every other day.    LISINOPRIL 30 MG TABLET    once every 24 hours.    LISINOPRIL-HYDROCHLOROTHIAZIDE 20-25 MG TABLET    Take 1 tablet by mouth once daily.    LOSARTAN (COZAAR) 100 MG TABLET    Take 1 tablet (100  mg) by mouth once daily.    MELATONIN 10 MG CAPSULE    Take 1 capsule (10 mg) by mouth once daily at bedtime.    MULTIVITAMIN WITH MINERALS IRON-FREE (CENTRUM SILVER)    Take 1 tablet by mouth once daily.    NAPROXEN (NAPROSYN) 500 MG TABLET    TAKE 1 TABLET BY MOUTH WITH FOOD TWICE A DAY    NITROGLYCERIN (NITROSTAT) 0.4 MG SL TABLET    Place under the tongue once daily. as directed    NYAMYC 100,000 UNIT/GRAM POWDER    Apply 1 Application topically 3 times a day. To rash    PRAVASTATIN (PRAVACHOL) 40 MG TABLET    Take 1 tablet (40 mg) by mouth once daily.    ROSUVASTATIN (CRESTOR) 40 MG TABLET    Take 1 tablet (40 mg) by mouth once daily at bedtime.    SPIRONOLACTONE (ALDACTONE) 25 MG TABLET    Take 1 tablet (25 mg) by mouth once daily.    TRELEGY ELLIPTA 100-62.5-25 MCG BLISTER WITH DEVICE       Modified Medications    No medications on file   Discontinued Medications    No medications on file       Objective   Physical Exam  General: Patient in no acute distress   HEENT: Atraumatic normocephalic.  Neck: Supple, jugular venous pressure within normal limit.  No bruits  Lungs: Clear to auscultation bilaterally  Cardiovascular: Regular rate and rhythm, normal heart sounds, no murmurs rubs or gallops  Abdomen: Soft nontender nondistended.  Normal bowel sounds.  Extremities: Warm to touch, no edema.    Lab Review   No visits with results within 2 Month(s) from this visit.   Latest known visit with results is:   Orders Only on 09/23/2023   Component Date Value    Cholesterol 09/23/2023 166     HDL 09/23/2023 68.1     Cholesterol/HDL Ratio 09/23/2023 2.4     LDL 09/23/2023 86     VLDL 09/23/2023 12     Triglycerides 09/23/2023 58     Glucose 09/23/2023 89     Sodium 09/23/2023 143     Potassium 09/23/2023 5.4 (H)     Chloride 09/23/2023 108 (H)     Bicarbonate 09/23/2023 27     Anion Gap 09/23/2023 13     Urea Nitrogen 09/23/2023 25 (H)     Creatinine 09/23/2023 0.88     GFR Female 09/23/2023 69     Calcium 09/23/2023  9.5     Albumin 09/23/2023 3.9     Alkaline Phosphatase 09/23/2023 95     Total Protein 09/23/2023 6.7     AST 09/23/2023 17     Total Bilirubin 09/23/2023 0.5     ALT (SGPT) 09/23/2023 15         Assessment/Plan   Patient Active Problem List   Diagnosis    Acute kidney injury (CMS/HCC)    Acute on chronic diastolic heart failure (CMS/HCC)    Age-related nuclear cataract of right eye    Aortic dilatation (CMS/HCC)    Asthma    Bilateral keratitis sicca (CMS/HCC)    BPPV (benign paroxysmal positional vertigo)    Angina pectoris (CMS/HCC)    Bursitis of hip    Chest pain    Greater trochanteric bursitis    Choroidal nevus, right eye    Chronic cough    Chronic renal impairment, stage 3 (moderate) (CMS/HCC)    Chronic renal insufficiency    Colon polyp    Degeneration of intervertebral disc of lumbar region    Thoracic degenerative disc disease    Depression with anxiety    Diaphragmatic paralysis    Diastolic dysfunction    Discoid lupus erythematosus of right eyelid    Vertigo    Dyslipidemia    Dyspnea on exertion    Arthritis    Eczema    Edema    Esophageal reflux    Esophageal spasm    Essential hypertension    Female cystocele    Gout    Hematochezia    Hemorrhoids    Herpes zoster    Hypercalcemia    Hyperkalemia    Hyperlipidemia    Hypoglycemia    Insomnia    Keratitis    Left carpal tunnel syndrome    Right carpal tunnel syndrome    Lesion of right eyelid    Lipoma    Lumbar back sprain    Lymphedema of both lower extremities    Morbid obesity (CMS/HCC)    Obesity hypoventilation syndrome (CMS/HCC)    Obstructive sleep apnea    Osteoarthritis of left knee    Osteoarthritis of right knee    Osteopenia    Ovarian cyst, complex    Primary hyperparathyroidism (CMS/HCC)    Osteoarthritis of left hand    Primary osteoarthritis, right hand    Recurrent major depressive disorder, in partial remission (CMS/HCC)    Seborrheic keratoses    Second degree uterine prolapse    Spondylosis of lumbar spine    Status post  penetrating keratoplasty    Subjective visual disturbance of left eye    Swelling of left lower extremity    Syncope    Synovial cyst of left knee    Urinary hesitancy    Vitamin D deficiency    Vitreous floaters of both eyes    Weight loss    Bruch membrane dystrophy, left    Current smoker    Eye injury, penetrating, left, sequela    Hand pain    Lipedema of lower extremity    Mild intermittent asthma without complication    Posterior vitreous detachment of right eye    Pseudophakia    Vitreous syneresis of both eyes      This is a very pleasant 73-year-old female with history of left diaphragmatic paralysis, previous history of shortness of breath and chest pain with activity. 2 D echo in 2019 showed normal ejection fraction mild mitral regurgitation. Patient had a cardiac catheterization in 2020 showed no significant coronary artery disease. Patient had a stress test in December 2022 showed no ischemia back then it was workup for jaw pain. Has not been having any jaw pain.  Patient feeling overall good.  Her breathing has improved after her diaphragm pacemaker and 3 oh.  Still having chronic lymphedema.  Denies having chest pain palpitations dizziness or syncope.  Diastolic blood pressure has been running high at home.  She is on lisinopril hydrochlorothiazide her potassium was 5.4 she is also on statin not on any additional antihypertensive medication.  Did not tolerate amlodipine before I will start her on hydralazine 25 mg oral twice daily.  We will follow-up in a year.  Otherwise he stable cardiac rod    Vianey Louise MD

## 2024-02-24 ENCOUNTER — LAB (OUTPATIENT)
Dept: LAB | Facility: LAB | Age: 74
End: 2024-02-24
Payer: MEDICARE

## 2024-02-24 DIAGNOSIS — E78.5 HYPERLIPIDEMIA, UNSPECIFIED: ICD-10-CM

## 2024-02-24 DIAGNOSIS — R73.02 IMPAIRED GLUCOSE TOLERANCE (ORAL): ICD-10-CM

## 2024-02-24 DIAGNOSIS — I10 ESSENTIAL (PRIMARY) HYPERTENSION: Primary | ICD-10-CM

## 2024-02-24 LAB
ALBUMIN SERPL-MCNC: 4 G/DL (ref 3.5–5)
ALP BLD-CCNC: 97 U/L (ref 35–125)
ALT SERPL-CCNC: 14 U/L (ref 5–40)
ANION GAP SERPL CALC-SCNC: 10 MMOL/L
AST SERPL-CCNC: 19 U/L (ref 5–40)
BILIRUB SERPL-MCNC: 0.3 MG/DL (ref 0.1–1.2)
BUN SERPL-MCNC: 27 MG/DL (ref 8–25)
CALCIUM SERPL-MCNC: 9.9 MG/DL (ref 8.5–10.4)
CHLORIDE SERPL-SCNC: 106 MMOL/L (ref 97–107)
CHOLEST SERPL-MCNC: 133 MG/DL (ref 133–200)
CHOLEST/HDLC SERPL: 1.9 {RATIO}
CO2 SERPL-SCNC: 27 MMOL/L (ref 24–31)
CREAT SERPL-MCNC: 1 MG/DL (ref 0.4–1.6)
EGFRCR SERPLBLD CKD-EPI 2021: 60 ML/MIN/1.73M*2
ERYTHROCYTE [DISTWIDTH] IN BLOOD BY AUTOMATED COUNT: 13.5 % (ref 11.5–14.5)
EST. AVERAGE GLUCOSE BLD GHB EST-MCNC: 114 MG/DL
GLUCOSE SERPL-MCNC: 100 MG/DL (ref 65–99)
HBA1C MFR BLD: 5.6 %
HCT VFR BLD AUTO: 38.7 % (ref 36–46)
HDLC SERPL-MCNC: 69 MG/DL
HGB BLD-MCNC: 12.1 G/DL (ref 12–16)
LDLC SERPL CALC-MCNC: 55 MG/DL (ref 65–130)
MCH RBC QN AUTO: 29.8 PG (ref 26–34)
MCHC RBC AUTO-ENTMCNC: 31.3 G/DL (ref 32–36)
MCV RBC AUTO: 95 FL (ref 80–100)
NRBC BLD-RTO: 0 /100 WBCS (ref 0–0)
PLATELET # BLD AUTO: 292 X10*3/UL (ref 150–450)
POTASSIUM SERPL-SCNC: 5.5 MMOL/L (ref 3.4–5.1)
PROT SERPL-MCNC: 6.9 G/DL (ref 5.9–7.9)
RBC # BLD AUTO: 4.06 X10*6/UL (ref 4–5.2)
SODIUM SERPL-SCNC: 143 MMOL/L (ref 133–145)
TRIGL SERPL-MCNC: 45 MG/DL (ref 40–150)
TSH SERPL DL<=0.05 MIU/L-ACNC: 2.81 MIU/L (ref 0.27–4.2)
WBC # BLD AUTO: 6.5 X10*3/UL (ref 4.4–11.3)

## 2024-02-24 PROCEDURE — 85027 COMPLETE CBC AUTOMATED: CPT

## 2024-02-24 PROCEDURE — 83036 HEMOGLOBIN GLYCOSYLATED A1C: CPT

## 2024-02-24 PROCEDURE — 80061 LIPID PANEL: CPT

## 2024-02-24 PROCEDURE — 36415 COLL VENOUS BLD VENIPUNCTURE: CPT

## 2024-02-24 PROCEDURE — 84443 ASSAY THYROID STIM HORMONE: CPT

## 2024-02-24 PROCEDURE — 80053 COMPREHEN METABOLIC PANEL: CPT

## 2024-03-27 ENCOUNTER — HOSPITAL ENCOUNTER (OUTPATIENT)
Dept: RADIOLOGY | Facility: CLINIC | Age: 74
Discharge: HOME | End: 2024-03-27
Payer: MEDICARE

## 2024-03-27 ENCOUNTER — OFFICE VISIT (OUTPATIENT)
Dept: ORTHOPEDIC SURGERY | Facility: CLINIC | Age: 74
End: 2024-03-27
Payer: MEDICARE

## 2024-03-27 VITALS — BODY MASS INDEX: 44.13 KG/M2 | WEIGHT: 249.12 LBS

## 2024-03-27 DIAGNOSIS — M75.52 BURSITIS OF LEFT SHOULDER: ICD-10-CM

## 2024-03-27 DIAGNOSIS — M25.512 LEFT SHOULDER PAIN, UNSPECIFIED CHRONICITY: Primary | ICD-10-CM

## 2024-03-27 DIAGNOSIS — M19.042 LOCALIZED OSTEOARTHRITIS OF LEFT HAND: ICD-10-CM

## 2024-03-27 DIAGNOSIS — R52 PAIN: ICD-10-CM

## 2024-03-27 DIAGNOSIS — M79.642 LEFT HAND PAIN: ICD-10-CM

## 2024-03-27 DIAGNOSIS — M79.643 HAND PAIN: ICD-10-CM

## 2024-03-27 PROCEDURE — 20600 DRAIN/INJ JOINT/BURSA W/O US: CPT | Performed by: ORTHOPAEDIC SURGERY

## 2024-03-27 PROCEDURE — 99213 OFFICE O/P EST LOW 20 MIN: CPT | Performed by: ORTHOPAEDIC SURGERY

## 2024-03-27 PROCEDURE — 1036F TOBACCO NON-USER: CPT | Performed by: ORTHOPAEDIC SURGERY

## 2024-03-27 PROCEDURE — 2500000005 HC RX 250 GENERAL PHARMACY W/O HCPCS: Performed by: ORTHOPAEDIC SURGERY

## 2024-03-27 PROCEDURE — 1159F MED LIST DOCD IN RCRD: CPT | Performed by: ORTHOPAEDIC SURGERY

## 2024-03-27 PROCEDURE — 1160F RVW MEDS BY RX/DR IN RCRD: CPT | Performed by: ORTHOPAEDIC SURGERY

## 2024-03-27 PROCEDURE — 73060 X-RAY EXAM OF HUMERUS: CPT | Mod: LT

## 2024-03-27 PROCEDURE — 2500000004 HC RX 250 GENERAL PHARMACY W/ HCPCS (ALT 636 FOR OP/ED): Performed by: ORTHOPAEDIC SURGERY

## 2024-03-27 PROCEDURE — 73130 X-RAY EXAM OF HAND: CPT | Mod: LT

## 2024-03-27 PROCEDURE — 73060 X-RAY EXAM OF HUMERUS: CPT | Mod: LEFT SIDE | Performed by: ORTHOPAEDIC SURGERY

## 2024-03-27 PROCEDURE — 73130 X-RAY EXAM OF HAND: CPT | Mod: LEFT SIDE | Performed by: ORTHOPAEDIC SURGERY

## 2024-03-27 PROCEDURE — 20610 DRAIN/INJ JOINT/BURSA W/O US: CPT | Performed by: ORTHOPAEDIC SURGERY

## 2024-03-27 RX ORDER — TRIAMCINOLONE ACETONIDE 40 MG/ML
10 INJECTION, SUSPENSION INTRA-ARTICULAR; INTRAMUSCULAR
Status: COMPLETED | OUTPATIENT
Start: 2024-03-27 | End: 2024-03-27

## 2024-03-27 RX ORDER — LIDOCAINE HYDROCHLORIDE 10 MG/ML
1 INJECTION INFILTRATION; PERINEURAL
Status: COMPLETED | OUTPATIENT
Start: 2024-03-27 | End: 2024-03-27

## 2024-03-27 RX ADMIN — LIDOCAINE HYDROCHLORIDE 1 ML: 10 INJECTION, SOLUTION INFILTRATION; PERINEURAL at 14:47

## 2024-03-27 RX ADMIN — LIDOCAINE HYDROCHLORIDE 1 ML: 10 INJECTION, SOLUTION INFILTRATION; PERINEURAL at 14:46

## 2024-03-27 RX ADMIN — TRIAMCINOLONE ACETONIDE 10 MG: 400 INJECTION, SUSPENSION INTRA-ARTICULAR; INTRAMUSCULAR at 14:46

## 2024-03-27 RX ADMIN — TRIAMCINOLONE ACETONIDE 10 MG: 400 INJECTION, SUSPENSION INTRA-ARTICULAR; INTRAMUSCULAR at 14:47

## 2024-03-27 ASSESSMENT — ENCOUNTER SYMPTOMS
OCCASIONAL FEELINGS OF UNSTEADINESS: 0
DEPRESSION: 0
LOSS OF SENSATION IN FEET: 0

## 2024-03-27 ASSESSMENT — PAIN DESCRIPTION - DESCRIPTORS: DESCRIPTORS: ACHING

## 2024-03-27 ASSESSMENT — PAIN - FUNCTIONAL ASSESSMENT: PAIN_FUNCTIONAL_ASSESSMENT: 0-10

## 2024-03-27 ASSESSMENT — PATIENT HEALTH QUESTIONNAIRE - PHQ9
2. FEELING DOWN, DEPRESSED OR HOPELESS: NOT AT ALL
SUM OF ALL RESPONSES TO PHQ9 QUESTIONS 1 AND 2: 0
1. LITTLE INTEREST OR PLEASURE IN DOING THINGS: NOT AT ALL

## 2024-03-27 ASSESSMENT — LIFESTYLE VARIABLES: TOTAL SCORE: 0

## 2024-03-27 ASSESSMENT — PAIN SCALES - GENERAL: PAINLEVEL_OUTOF10: 10 - WORST POSSIBLE PAIN

## 2024-03-27 NOTE — PROGRESS NOTES
Subjective      Chief Complaint   Patient presents with    Left Hand - Pain        Past Surgical History:   Procedure Laterality Date    COLONOSCOPY  06/07/2013    Complete Colonoscopy    OTHER SURGICAL HISTORY  02/10/2014    Cornea Transplant        HPI  This 73 year old patient presents today with left shoulder and left hand pain 8. The patient states that the left shoulder pain has been present for months. The patient denies trauma or injury. The patient states that the left shoulder pain is worse with and aggravated by reaching and lifting.  She states that her left hand pain has recurred.  She had good relief with previous cortisone into the left hand at the base of the first metacarpal.  The patient states that this shoulder and hand pain is disabling and presents today to discuss further options. The patient states that they have tried tylenol and ibuprofen with no relief.    CARDIOLOGY:   Negative for chest pain, shortness of breath.   RESPIRATORY:   Negative for chest pain, shortness of breath.   MUSCULOSKELETAL:   See HPI for details.   NEUROLOGY:   Negative for tingling, numbness, weakness.    Objective      There were no vitals taken for this visit.     SHOULDER EXAM  Constitutional: Appears stated age. No apparent distress  Labored Breathing: No  Psychiatric: Normal mood and effect.   Neurological: alert and oriented x3  Skin: intact  HEENT: No bruising, otorrhea, rhinorrhea.  MUSCULOSKELETAL: Neck: No tenderness. No pain or limitation with range of motion. Back: No tenderness. Straight leg test negative bilaterally.  Left hand: There is pain and crepitus with range of motion at the left base of the left thumb.  Phalen's is negative.  Finkelstein's is negative.  Neurovascular is intact to light touch.  left shoulder: There is tenderness anteriorly and laterally. Active abduction and active flexion are 0-110 degrees but with pain and guarding. There is pain with and limitation of active and passive  internal and external rotation. Comparments are soft. Neurovascular is intact.     AP and lateral x-rays of the shoulder on Wednesday show no acute fracture or bone destruction.    AP and lateral x-rays of the left hand are in the office today show osteoarthritis at the base of the first metacarpal.    Patient ID: Ondina Simmons is a 73 y.o. female.    L Inj/Asp: L subacromial bursa on 3/27/2024 2:46 PM  Indications: pain  Details: 22 G needle, lateral approach  Medications: 1 mL lidocaine 10 mg/mL (1 %); 10 mg triamcinolone acetonide 40 mg/mL  Outcome: tolerated well, no immediate complications  Procedure, treatment alternatives, risks and benefits explained, specific risks discussed. Immediately prior to procedure a time out was called to verify the correct patient, procedure, equipment, support staff and site/side marked as required. Patient was prepped and draped in the usual sterile fashion.       S Inj/Asp: L thumb [facility] on 3/27/2024 2:47 PM  Indications: pain  Details: 22 G needle, radial approach  Medications: 1 mL lidocaine 10 mg/mL (1 %); 10 mg triamcinolone acetonide 40 mg/mL  Outcome: tolerated well, no immediate complications  Procedure, treatment alternatives, risks and benefits explained, specific risks discussed. Immediately prior to procedure a time out was called to verify the correct patient, procedure, equipment, support staff and site/side marked as required. Patient was prepped and draped in the usual sterile fashion.           Ondina was seen today for pain.  Diagnoses and all orders for this visit:  Left shoulder pain, unspecified chronicity (Primary)  Left hand pain  Bursitis of left shoulder  Localized osteoarthritis of left hand  Options are discussed with the patient in detail. The patient is instructed regarding activity modification, ice, physician directed at home gentle strengthening and ROM exercises, and the appropriate use of Tylenol as needed for pain with its potential adverse  reactions and side effects. The patient understands. The patient states that despite all the treatment listed above that this left shoulder pain and left hand pain is debilitating and  requests a discussion of further options. Cortisone injection to the left shoulder and the left hand at the base of first metacarpal is discussed in the office today. This is done in the office today. See procedures below. Return as needed, Please note that this report has been produced using speech recognition software.  It may contain errors related to grammar, punctuation or spelling.  Electronically signed, but not reviewed.       Alexi Paulino MD

## 2024-04-04 ENCOUNTER — APPOINTMENT (OUTPATIENT)
Dept: PHYSICAL THERAPY | Facility: CLINIC | Age: 74
End: 2024-04-04
Payer: MEDICARE

## 2024-04-26 ENCOUNTER — APPOINTMENT (OUTPATIENT)
Dept: RADIOLOGY | Facility: HOSPITAL | Age: 74
End: 2024-04-26
Payer: MEDICARE

## 2024-05-01 ENCOUNTER — OFFICE VISIT (OUTPATIENT)
Dept: OPHTHALMOLOGY | Facility: CLINIC | Age: 74
End: 2024-05-01
Payer: MEDICARE

## 2024-05-01 ENCOUNTER — APPOINTMENT (OUTPATIENT)
Dept: PHYSICAL THERAPY | Facility: CLINIC | Age: 74
End: 2024-05-01
Payer: MEDICARE

## 2024-05-01 DIAGNOSIS — H40.2221 CHRONIC ANGLE-CLOSURE GLAUCOMA, MILD STAGE, LEFT: Primary | ICD-10-CM

## 2024-05-01 PROCEDURE — 99214 OFFICE O/P EST MOD 30 MIN: CPT | Performed by: OPHTHALMOLOGY

## 2024-05-01 PROCEDURE — 92083 EXTENDED VISUAL FIELD XM: CPT | Performed by: OPHTHALMOLOGY

## 2024-05-01 ASSESSMENT — SLIT LAMP EXAM - LIDS
COMMENTS: GOOD POSITION
COMMENTS: GOOD POSITION

## 2024-05-01 ASSESSMENT — CUP TO DISC RATIO
OD_RATIO: .3
OS_RATIO: .45

## 2024-05-01 ASSESSMENT — TONOMETRY
IOP_METHOD: GOLDMANN APPLANATION
OS_IOP_MMHG: 16
OD_IOP_MMHG: 12

## 2024-05-01 ASSESSMENT — CONF VISUAL FIELD
OD_SUPERIOR_NASAL_RESTRICTION: 0
OD_NORMAL: 1
OD_INFERIOR_TEMPORAL_RESTRICTION: 0
OS_SUPERIOR_NASAL_RESTRICTION: 0
OS_INFERIOR_TEMPORAL_RESTRICTION: 0
OS_INFERIOR_NASAL_RESTRICTION: 0
OS_SUPERIOR_TEMPORAL_RESTRICTION: 0
OS_NORMAL: 1
OD_INFERIOR_NASAL_RESTRICTION: 0
OD_SUPERIOR_TEMPORAL_RESTRICTION: 0

## 2024-05-01 ASSESSMENT — PACHYMETRY
OS_CT(UM): 623
OD_CT(UM): 556

## 2024-05-01 ASSESSMENT — VISUAL ACUITY
OS_CC+: -1
CORRECTION_TYPE: GLASSES
METHOD: SNELLEN - LINEAR
OD_CC: 20/20
OD_BAT_MED: 20/20
OS_BAT_MED: 20/40
OD_CC+: -1
OS_CC: 20/30

## 2024-05-01 ASSESSMENT — EXTERNAL EXAM - RIGHT EYE: OD_EXAM: NORMAL

## 2024-05-01 ASSESSMENT — ENCOUNTER SYMPTOMS
CONSTITUTIONAL NEGATIVE: 0
EYES NEGATIVE: 0
MUSCULOSKELETAL NEGATIVE: 0
PSYCHIATRIC NEGATIVE: 0
GASTROINTESTINAL NEGATIVE: 0
NEUROLOGICAL NEGATIVE: 0
HEMATOLOGIC/LYMPHATIC NEGATIVE: 0
RESPIRATORY NEGATIVE: 0
ENDOCRINE NEGATIVE: 0
ALLERGIC/IMMUNOLOGIC NEGATIVE: 0
CARDIOVASCULAR NEGATIVE: 0

## 2024-05-01 ASSESSMENT — EXTERNAL EXAM - LEFT EYE: OS_EXAM: NORMAL

## 2024-05-01 NOTE — PROGRESS NOTES
Dry eye/blepharitis, both eyes:     Rec AT 4-6x daily, can also dry AT Gel     Lacrilube nightly     Unable to afford restasis             hx of trauma OS:     s/p PK     s/p secondary IOL     with irregular iris     doing great     continue mrx with poly carb lenses,  monocular precautions          chronic angle closure, left eye     IOP normal     pach thicker     OCT RNFL 3/6/23 WNL and symmetric    Herzog Visual Field - OU - Both Eyes          Right Eye  Threshold was 24-2. Findings include normal observations.     Left Eye  Threshold was 24-2. Findings include generalized depression.     Notes  No evidence of glaucoma OU             monitor for now     rtc 12 months bAT/dfe/oct mac/oct rnfl/lens star          cataract OD: not visually signifcant. monitor

## 2024-05-09 ENCOUNTER — EVALUATION (OUTPATIENT)
Dept: PHYSICAL THERAPY | Facility: CLINIC | Age: 74
End: 2024-05-09
Payer: MEDICARE

## 2024-05-09 DIAGNOSIS — R60.0 LIPEDEMA OF LOWER EXTREMITY: ICD-10-CM

## 2024-05-09 DIAGNOSIS — I89.0 LYMPHEDEMA OF BOTH LOWER EXTREMITIES: Primary | ICD-10-CM

## 2024-05-09 DIAGNOSIS — I89.0 LYMPHEDEMA, NOT ELSEWHERE CLASSIFIED: ICD-10-CM

## 2024-05-09 PROCEDURE — 97162 PT EVAL MOD COMPLEX 30 MIN: CPT | Mod: GP

## 2024-05-09 PROCEDURE — 97530 THERAPEUTIC ACTIVITIES: CPT | Mod: GP

## 2024-05-09 ASSESSMENT — ENCOUNTER SYMPTOMS
OCCASIONAL FEELINGS OF UNSTEADINESS: 0
DEPRESSION: 0
LOSS OF SENSATION IN FEET: 0

## 2024-05-09 ASSESSMENT — PAIN SCALES - GENERAL: PAINLEVEL_OUTOF10: 5 - MODERATE PAIN

## 2024-05-09 ASSESSMENT — PAIN DESCRIPTION - DESCRIPTORS: DESCRIPTORS: CRAMPING

## 2024-05-09 ASSESSMENT — PAIN - FUNCTIONAL ASSESSMENT: PAIN_FUNCTIONAL_ASSESSMENT: 0-10

## 2024-05-09 NOTE — PROGRESS NOTES
Physical Therapy Evaluation and Treatment      Patient Name: Ondina Simmons  MRN: [id number]  Today's Date: 5/9/2024  Time Calculation  Start Time: 1000  Stop Time: 1100  Time Calculation (min): 60 min  PT Therapeutic Procedures Time Entry  Therapeutic Activity Time Entry: 20    Moderate complexity due to patient's clinical presentation being evolving with changing characteristics, with comorbidities/complexities to include lymphedema, lipedema, XIN, OA, CRI, all of which may negatively impact rehab tolerance and progression.    Insurance:  Visit number: 1 of 12  Authorization info: [place]  Insurance Type: Payor: [facility] MEDICARE / Plan: [employer] MEDICARE ASSURE / Product Type: *No Product type* /     Current Problem:   1. Lymphedema of both lower extremities  Follow Up In Physical Therapy      2. Lymphedema, not elsewhere classified  Referral to Physical Therapy      3. Lipedema of lower extremity  Follow Up In Physical Therapy          Subjective    Pt reports she noticed swelling in her legs beginning about 2015; states she was told she has lipedema.  Reports that swelling has been worsening over the last few years.  Pt was seen by this therapist for treatment of lymphedema for 19 visits between 4/2023 and 8/2023. Pt saw significant reduction in lower leg edema and was fitted for new  knee-high compression stockings.  Since then, pt has been wearing stocking daily and uses inelastic garments at night.  Has a pump  but only uses 1-2 times a month.  Pt now concerned with edema in thighs, dayday large lobes above knees. States she has difficulty finding pants to fit and often gets cramps in her legs.     Pt works part-time at [employer].    Pt's goal is to reduce edema in thighs    General  Reason for Referral: lymphedema  Referred By: Dr. Teodoro SCHILLINGADI Fall Risk Score (The score of 4 or more indicates an increased risk of falling): 0  Medical Precautions: Lymphedema precautions    Pain Assessment  Pain  Assessment: 0-10  Pain Score: 5 - Moderate pain  Pain Location: Leg  Pain Orientation: Right, Left  Pain Descriptors: Cramping  Pain Frequency: Intermittent    Objective   ROM    Mild limitations in B LE ROM due to soft tissue approximation    MMT    Grossly WFL B LE    Leg Girth:   Right      Left    Foot        21.7cm   22.5cm    10cm      31.3cm   34.5cm    20cm      43.0cm   44.8cm    30cm      51.2cm   51.6cm    40cm      50.2cm   52.0cm    50cm      66.4cm   69.2cm  (-)stemmer sign r/l  non-pitting, skin soft and spongy, no fibosis, no wounds  Coloring, temp WFL  ankle cuff present bilaterally; minimal edema in feet    Outcome Measures:  Other: LLIS 19    Treatments:  Therapeutic activity:   Education:  reviewed self-care strategies.  Suggested return to using pump daily. Discussed compression alternatives for thighs including short-stretch bandages and thigh-high garments.      Assessment   PT Assessment Results: Decreased mobility, Pain (edema)  Rehab Prognosis: Good  Evaluation/Treatment Tolerance: Patient tolerated treatment well  Assessment Comment: Pt is a 72 y/o female with s/s of stage 1 lipolymphedema.  Pt has been seen by this therapist in the past. Lower legs have been well-controlled with use of compression stockings, exercise and elevation.  Pt now want to address edema in thighs.  Recommend return to complete decongestive therapy, including multi-layer compression bandaging to full leg to decongest thighs.  Moderate complexity due to patient's clinical presentation being evolving with changing characteristics, with comorbidities/complexities to include lymphedema, lipedema, XIN, OA, CRI, all of which may negatively impact rehab tolerance and progression.     Plan:   Treatment/Interventions: Education/ Instruction, Manual therapy, Taping techniques, Therapeutic activities, Therapeutic exercises (complete decongestive therapy  including skin care, remedial exercises, manual lymph drainage and  compression therapy.)  PT Plan: Skilled PT  PT Frequency: 2 times per week  Duration: 6 weeks or 12 visits  Onset Date: 05/09/24  Certification Period Start Date: 05/09/24  Certification Period End Date: 08/07/24  Number of Treatments Authorized: [place]  Rehab Potential: Good  Plan of Care Agreement: Patient      Goals:   Active       PT Problem       PT Goal 1       Start:  05/09/24    Expected End:  08/07/24       Pt independent with lymphedema risk-reduction strategies         PT Goal 2       Start:  05/09/24    Expected End:  08/07/24       Decrease B thigh girth by at least 2 cm to improve fit of clothing         PT Goal 3       Start:  05/09/24    Expected End:  08/07/24       Improve LLIS to no more than 10         PT Goal 4       Start:  05/09/24    Expected End:  08/07/24       Pt independent with lymphedema self-care/self management strategies

## 2024-05-09 NOTE — LETTER
May 9, 2024    Te Valerio MD  [id number]  [address]  [facility] [address]    Patient: Ondina Simmons   YOB: 1950   Date of Visit: 5/9/2024       Dear Te Valerio MD  [address]  [address]  [place] [address]    The attached plan of care is being sent to you because your patient’s medical reimbursement requires that you certify the plan of care. Your signature is required to allow uninterrupted insurance coverage.      You may indicate your approval by signing below and faxing this form back to us at Dept Fax: [phone].    Please call Dept: [phone] with any questions or concerns.    Thank you for this referral,        Francisca Walsh, PT  [facility]  [facility]YS[facility]IAN [facility]  [address]  [facility] [address]    Payer: Payor: AETNA MEDICARE / Plan: AETNA MEDICARE ASSURE / Product Type: *No Product type* /                                                                         Date:     Dear Francisca Walsh, PT,     Re: Ms. Ondina Simmons, MRN:[id number]    I certify that I have reviewed the attached plan of care and it is medically necessary for Ms. Ondina Simmons (1950) who is under my care.          ______________________________________                    _________________  Provider name and credentials                                           Date and time                                                                                           Plan of Care 5/9/24   Effective from: 5/9/2024  Effective to: 8/7/2024    Plan ID: [id number]            Participants as of Finalize on 5/9/2024    Name Type Comments Contact Info    Te Valerio MD Primary Care Provider  [phone]    Francisca Walsh PT Physical Therapist  [phone]       Last Plan Note     Author: Francisca Walsh PT Status: Incomplete Last edited: 5/9/2024 10:00 AM       Physical Therapy Evaluation and Treatment      Patient Name:  Ondina Simmons  MRN: [id number]  Today's Date: 5/9/2024  Time Calculation  Start Time: 1000  Stop Time: 1100  Time Calculation (min): 60 min  PT Therapeutic Procedures Time Entry  Therapeutic Activity Time Entry: 20    Moderate complexity due to patient's clinical presentation being evolving with changing characteristics, with comorbidities/complexities to include lymphedema, lipedema, XIN, OA, CRI, all of which may negatively impact rehab tolerance and progression.    Insurance:  Visit number: 1 of 12  Authorization info: [place]  Insurance Type: Payor: AETXIMENA MEDICARE / Plan: AETNA MEDICARE ASSURE / Product Type: *No Product type* /     Current Problem:   1. Lymphedema of both lower extremities  Follow Up In Physical Therapy      2. Lymphedema, not elsewhere classified  Referral to Physical Therapy      3. Lipedema of lower extremity  Follow Up In Physical Therapy          Subjective   Pt reports she noticed swelling in her legs beginning about 2015; states she was told she has lipedema.  Reports that swelling has been worsening over the last few years.  Pt was seen by this therapist for treatment of lymphedema for 19 visits between 4/2023 and 8/2023. Pt saw significant reduction in lower leg edema and was fitted for new  knee-high compression stockings.  Since then, pt has been wearing stocking daily and uses inelastic garments at night.  Has a pump  but only uses 1-2 times a month.  Pt now concerned with edema in thighs, dayday large lobes above knees. States she has difficulty finding pants to fit and often gets cramps in her legs.     Pt works part-time at [employer].    Pt's goal is to reduce edema in thighs    General  Reason for Referral: lymphedema  Referred By: Dr. Teodoro Rosen  STEADI Fall Risk Score (The score of 4 or more indicates an increased risk of falling): 0  Medical Precautions: Lymphedema precautions    Pain Assessment  Pain Assessment: 0-10  Pain Score: 5 - Moderate pain  Pain Location:  Leg  Pain Orientation: Right, Left  Pain Descriptors: Cramping  Pain Frequency: Intermittent    Objective  ROM    Mild limitations in B LE ROM due to soft tissue approximation    MMT    Grossly WFL B LE    Leg Girth:   Right      Left    Foot        21.7cm   22.5cm    10cm      31.3cm   34.5cm    20cm      43.0cm   44.8cm    30cm      51.2cm   51.6cm    40cm      50.2cm   52.0cm    50cm      66.4cm   69.2cm  (-)stemmer sign r/l  non-pitting, skin soft and spongy, no fibosis, no wounds  Coloring, temp WFL  ankle cuff present bilaterally; minimal edema in feet    Outcome Measures:  Other: LLIS 19    Treatments:  Therapeutic activity:   Education:  reviewed self-care strategies.  Suggested return to using pump daily. Discussed compression alternatives for thighs including short-stretch bandages and thigh-high garments.      Assessment  PT Assessment Results: Decreased mobility, Pain (edema)  Rehab Prognosis: Good  Evaluation/Treatment Tolerance: Patient tolerated treatment well  Assessment Comment: Pt is a 74 y/o female with s/s of stage 1 lipolymphedema.  Pt has been seen by this therapist in the past. Lower legs have been well-controlled with use of compression stockings, exercise and elevation.  Pt now want to address edema in thighs.  Recommend return to complete decongestive therapy, including multi-layer compression bandaging to full leg to decongest thighs.  Moderate complexity due to patient's clinical presentation being evolving with changing characteristics, with comorbidities/complexities to include lymphedema, lipedema, XIN, OA, CRI, all of which may negatively impact rehab tolerance and progression.     Plan:   Treatment/Interventions: Education/ Instruction, Manual therapy, Taping techniques, Therapeutic activities, Therapeutic exercises (complete decongestive therapy  including skin care, remedial exercises, manual lymph drainage and compression therapy.)  PT Plan: Skilled PT  PT Frequency: 2 times per  week  Duration: 6 weeks or 12 visits  Onset Date: 05/09/24  Certification Period Start Date: 05/09/24  Certification Period End Date: 08/07/24  Number of Treatments Authorized: [place]  Rehab Potential: Good  Plan of Care Agreement: Patient      Goals:   Active       PT Problem       PT Goal 1       Start:  05/09/24    Expected End:  08/07/24       Pt independent with lymphedema risk-reduction strategies         PT Goal 2       Start:  05/09/24    Expected End:  08/07/24       Decrease B thigh girth by at least 2 cm to improve fit of clothing         PT Goal 3       Start:  05/09/24    Expected End:  08/07/24       Improve LLIS to no more than 10         PT Goal 4       Start:  05/09/24    Expected End:  08/07/24       Pt independent with lymphedema self-care/self management strategies                              Current Participants as of 5/9/2024    Name Type Comments Contact Info    Te Valerio MD Primary Care Provider  [phone]    Signature pending    Francisca Walsh, PT Physical Therapist  [phone]

## 2024-05-16 ENCOUNTER — HOSPITAL ENCOUNTER (OUTPATIENT)
Dept: RADIOLOGY | Facility: HOSPITAL | Age: 74
Discharge: HOME | End: 2024-05-16
Payer: MEDICARE

## 2024-05-16 ENCOUNTER — OFFICE VISIT (OUTPATIENT)
Dept: SURGERY | Facility: CLINIC | Age: 74
End: 2024-05-16
Payer: MEDICARE

## 2024-05-16 VITALS
SYSTOLIC BLOOD PRESSURE: 147 MMHG | BODY MASS INDEX: 43.05 KG/M2 | OXYGEN SATURATION: 95 % | HEART RATE: 75 BPM | WEIGHT: 243 LBS | DIASTOLIC BLOOD PRESSURE: 93 MMHG | HEIGHT: 63 IN

## 2024-05-16 DIAGNOSIS — J98.6 DIAPHRAGM DYSFUNCTION: Primary | ICD-10-CM

## 2024-05-16 DIAGNOSIS — J98.6 DIAPHRAGM DYSFUNCTION: ICD-10-CM

## 2024-05-16 PROCEDURE — 1160F RVW MEDS BY RX/DR IN RCRD: CPT | Performed by: NURSE PRACTITIONER

## 2024-05-16 PROCEDURE — 99214 OFFICE O/P EST MOD 30 MIN: CPT | Performed by: NURSE PRACTITIONER

## 2024-05-16 PROCEDURE — 1036F TOBACCO NON-USER: CPT | Performed by: NURSE PRACTITIONER

## 2024-05-16 PROCEDURE — 3077F SYST BP >= 140 MM HG: CPT | Performed by: NURSE PRACTITIONER

## 2024-05-16 PROCEDURE — 1126F AMNT PAIN NOTED NONE PRSNT: CPT | Performed by: NURSE PRACTITIONER

## 2024-05-16 PROCEDURE — 1159F MED LIST DOCD IN RCRD: CPT | Performed by: NURSE PRACTITIONER

## 2024-05-16 PROCEDURE — 76000 FLUOROSCOPY <1 HR PHYS/QHP: CPT

## 2024-05-16 PROCEDURE — 3080F DIAST BP >= 90 MM HG: CPT | Performed by: NURSE PRACTITIONER

## 2024-05-16 PROCEDURE — 71046 X-RAY EXAM CHEST 2 VIEWS: CPT | Performed by: STUDENT IN AN ORGANIZED HEALTH CARE EDUCATION/TRAINING PROGRAM

## 2024-05-16 PROCEDURE — 71046 X-RAY EXAM CHEST 2 VIEWS: CPT

## 2024-05-16 ASSESSMENT — PAIN SCALES - GENERAL: PAINLEVEL: 0-NO PAIN

## 2024-05-16 NOTE — PROGRESS NOTES
Mrs Simmons is now 2 years and 1 months post DP. She used device full time since implant. She had elevated right HD of unknown origin and unknown how long diaphragm was elevated. She did have movement at surgery albeit weak. She has had positive dEMG since implant.   Today she continues to report no change in symptoms. She was started on Trelegy to treat COPD and this has improved her breathing.   Today we did SNIFF and CXR. The right HD continues to be elevated. There is no change in SNIFF- there is still some paradoxical with sniff maneuver.   EMG today is also relatively unchanged. She had good baseline.    We discussed today that she has now paced for full 2 years and there has been no change. At this time we will stop pacing. If she feels any regressions then we will resume. If she has no changes after 8 weeks we can remove electrodes.     Exam: no deficits, well appearing, obese, increased work of breathing with walking, no distress. Abd soft, wire sites clean dry intact, no edema. Skin warm dry.     Stop pacing, resume if you feel you have increase in SOB symptoms without. If no change call for electrode removal.   Plication could be an option for you. You do need to loose about 25 pounds for plication. We could remove wires when you have plication surgery.

## 2024-06-22 ENCOUNTER — LAB (OUTPATIENT)
Dept: LAB | Facility: LAB | Age: 74
End: 2024-06-22
Payer: MEDICARE

## 2024-06-22 DIAGNOSIS — R10.84 GENERALIZED ABDOMINAL PAIN: Primary | ICD-10-CM

## 2024-06-22 DIAGNOSIS — R73.02 IMPAIRED GLUCOSE TOLERANCE (ORAL): ICD-10-CM

## 2024-06-22 DIAGNOSIS — E03.9 HYPOTHYROIDISM, UNSPECIFIED: ICD-10-CM

## 2024-06-22 DIAGNOSIS — I10 ESSENTIAL (PRIMARY) HYPERTENSION: ICD-10-CM

## 2024-06-22 DIAGNOSIS — E78.5 HYPERLIPIDEMIA, UNSPECIFIED: ICD-10-CM

## 2024-06-22 LAB
ALBUMIN SERPL BCP-MCNC: 3.9 G/DL (ref 3.4–5)
ALP SERPL-CCNC: 69 U/L (ref 33–136)
ALT SERPL W P-5'-P-CCNC: 14 U/L (ref 7–45)
ANION GAP SERPL CALC-SCNC: 14 MMOL/L (ref 10–20)
AST SERPL W P-5'-P-CCNC: 16 U/L (ref 9–39)
BILIRUB SERPL-MCNC: 0.7 MG/DL (ref 0–1.2)
BUN SERPL-MCNC: 28 MG/DL (ref 6–23)
CALCIUM SERPL-MCNC: 9.9 MG/DL (ref 8.6–10.6)
CANCER AG19-9 SERPL-ACNC: 8.79 U/ML
CHLORIDE SERPL-SCNC: 104 MMOL/L (ref 98–107)
CO2 SERPL-SCNC: 28 MMOL/L (ref 21–32)
CREAT SERPL-MCNC: 1.38 MG/DL (ref 0.5–1.05)
EGFRCR SERPLBLD CKD-EPI 2021: 40 ML/MIN/1.73M*2
ERYTHROCYTE [DISTWIDTH] IN BLOOD BY AUTOMATED COUNT: 13.6 % (ref 11.5–14.5)
EST. AVERAGE GLUCOSE BLD GHB EST-MCNC: 120 MG/DL
GLUCOSE SERPL-MCNC: 99 MG/DL (ref 74–99)
HBA1C MFR BLD: 5.8 %
HCT VFR BLD AUTO: 38.4 % (ref 36–46)
HGB BLD-MCNC: 12.1 G/DL (ref 12–16)
MCH RBC QN AUTO: 30 PG (ref 26–34)
MCHC RBC AUTO-ENTMCNC: 31.5 G/DL (ref 32–36)
MCV RBC AUTO: 95 FL (ref 80–100)
NRBC BLD-RTO: 0 /100 WBCS (ref 0–0)
PLATELET # BLD AUTO: 262 X10*3/UL (ref 150–450)
POTASSIUM SERPL-SCNC: 5 MMOL/L (ref 3.5–5.3)
PROT SERPL-MCNC: 6.7 G/DL (ref 6.4–8.2)
RBC # BLD AUTO: 4.04 X10*6/UL (ref 4–5.2)
SODIUM SERPL-SCNC: 141 MMOL/L (ref 136–145)
TSH SERPL-ACNC: 2.33 MIU/L (ref 0.44–3.98)
WBC # BLD AUTO: 6 X10*3/UL (ref 4.4–11.3)

## 2024-06-22 PROCEDURE — 84443 ASSAY THYROID STIM HORMONE: CPT

## 2024-06-22 PROCEDURE — 85027 COMPLETE CBC AUTOMATED: CPT

## 2024-06-22 PROCEDURE — 83036 HEMOGLOBIN GLYCOSYLATED A1C: CPT

## 2024-06-22 PROCEDURE — 80053 COMPREHEN METABOLIC PANEL: CPT

## 2024-06-22 PROCEDURE — 86301 IMMUNOASSAY TUMOR CA 19-9: CPT | Mod: WAIVER OF LIABILITY ON FILE

## 2024-06-22 PROCEDURE — 36415 COLL VENOUS BLD VENIPUNCTURE: CPT

## 2024-06-28 ENCOUNTER — APPOINTMENT (OUTPATIENT)
Dept: PHYSICAL THERAPY | Facility: CLINIC | Age: 74
End: 2024-06-28
Payer: MEDICARE

## 2024-07-10 ENCOUNTER — APPOINTMENT (OUTPATIENT)
Dept: OPHTHALMOLOGY | Facility: CLINIC | Age: 74
End: 2024-07-10
Payer: MEDICARE

## 2024-07-10 ENCOUNTER — APPOINTMENT (OUTPATIENT)
Dept: PHYSICAL THERAPY | Facility: CLINIC | Age: 74
End: 2024-07-10
Payer: MEDICARE

## 2024-07-10 DIAGNOSIS — H52.02 HYPEROPIA OF LEFT EYE: ICD-10-CM

## 2024-07-10 DIAGNOSIS — M35.01 BILATERAL KERATITIS SICCA (MULTI): ICD-10-CM

## 2024-07-10 DIAGNOSIS — H52.4 PRESBYOPIA: ICD-10-CM

## 2024-07-10 DIAGNOSIS — H52.222 REGULAR ASTIGMATISM OF LEFT EYE: ICD-10-CM

## 2024-07-10 DIAGNOSIS — H52.11 MYOPIA OF RIGHT EYE: ICD-10-CM

## 2024-07-10 DIAGNOSIS — S05.62XS: ICD-10-CM

## 2024-07-10 DIAGNOSIS — H25.11 AGE-RELATED NUCLEAR CATARACT OF RIGHT EYE: Primary | ICD-10-CM

## 2024-07-10 PROCEDURE — 92004 COMPRE OPH EXAM NEW PT 1/>: CPT | Performed by: OPHTHALMOLOGY

## 2024-07-10 ASSESSMENT — REFRACTION_MANIFEST
OS_SPHERE: UNABLE
OD_CYLINDER: -0.50
OS_SPHERE: +2.25
OD_SPHERE: -1.75
OD_ADD: +3.00
OS_CYLINDER: -5.75
OS_ADD: +3.00
METHOD_AUTOREFRACTION: 1
OD_AXIS: 020
OS_AXIS: 030
OD_SPHERE: -1.50

## 2024-07-10 ASSESSMENT — VISUAL ACUITY
OD_CC: 20/20-2
METHOD: SNELLEN - LINEAR
OS_CC: 20/40

## 2024-07-10 ASSESSMENT — PACHYMETRY
OD_CT(UM): 556
OS_CT(UM): 623

## 2024-07-10 ASSESSMENT — REFRACTION_WEARINGRX
OS_SPHERE: +1.50
OD_SPHERE: -1.75
OS_ADD: +2.75
OS_AXIS: 030
OD_AXIS: 010
OS_CYLINDER: -5.50
OD_CYLINDER: -0.25
OD_ADD: +2.75

## 2024-07-10 ASSESSMENT — CUP TO DISC RATIO
OS_RATIO: .45
OD_RATIO: .3

## 2024-07-10 ASSESSMENT — TONOMETRY
OS_IOP_MMHG: 16
OD_IOP_MMHG: 16
IOP_METHOD: GOLDMANN APPLANATION

## 2024-07-10 ASSESSMENT — SLIT LAMP EXAM - LIDS
COMMENTS: GOOD POSITION
COMMENTS: GOOD POSITION

## 2024-07-10 ASSESSMENT — EXTERNAL EXAM - RIGHT EYE: OD_EXAM: NORMAL

## 2024-07-10 ASSESSMENT — EXTERNAL EXAM - LEFT EYE: OS_EXAM: NORMAL

## 2024-07-10 NOTE — PROGRESS NOTES
Assessment/Plan   Diagnoses and all orders for this visit:  Age-related nuclear cataract of right eye  Not visually significant at the present time  continue to monitor    Bilateral keratitis sicca (Multi)  -Start artificial tears both eyes (OU) qid  -stress compliance    Eye injury, penetrating, left, sequela  -stable    Hx of penetrating keratoplasty left eye after trauma  -stable    Myopia of right eye  Hyperopia of left eye  Regular astigmatism of left eye  Presbyopia  Refractive error  -give Rx for new glasses  -Polycarbonate lens material specified on prescription    Follow up with Dr. Huerta at or with me in 1 year or sooner if having any problems

## 2024-07-17 ENCOUNTER — PREP FOR PROCEDURE (OUTPATIENT)
Dept: SURGERY | Facility: HOSPITAL | Age: 74
End: 2024-07-17
Payer: MEDICARE

## 2024-07-17 DIAGNOSIS — J98.6 DIAPHRAGM DYSFUNCTION: Primary | ICD-10-CM

## 2024-07-22 PROBLEM — J98.6 DIAPHRAGM DYSFUNCTION: Status: ACTIVE | Noted: 2024-07-17

## 2024-07-23 ENCOUNTER — APPOINTMENT (OUTPATIENT)
Dept: PHYSICAL THERAPY | Facility: CLINIC | Age: 74
End: 2024-07-23
Payer: MEDICARE

## 2024-07-25 ENCOUNTER — APPOINTMENT (OUTPATIENT)
Dept: PHYSICAL THERAPY | Facility: CLINIC | Age: 74
End: 2024-07-25
Payer: MEDICARE

## 2024-07-26 ENCOUNTER — LAB (OUTPATIENT)
Dept: LAB | Facility: LAB | Age: 74
End: 2024-07-26
Payer: MEDICARE

## 2024-07-26 DIAGNOSIS — E87.5 HYPERKALEMIA: ICD-10-CM

## 2024-07-26 DIAGNOSIS — M10.00 IDIOPATHIC GOUT, UNSPECIFIED SITE: ICD-10-CM

## 2024-07-26 DIAGNOSIS — I10 ESSENTIAL (PRIMARY) HYPERTENSION: Primary | ICD-10-CM

## 2024-07-26 LAB
ALBUMIN SERPL BCP-MCNC: 4 G/DL (ref 3.4–5)
ANION GAP SERPL CALC-SCNC: 13 MMOL/L (ref 10–20)
BUN SERPL-MCNC: 29 MG/DL (ref 6–23)
CALCIUM SERPL-MCNC: 9.8 MG/DL (ref 8.6–10.6)
CHLORIDE SERPL-SCNC: 104 MMOL/L (ref 98–107)
CO2 SERPL-SCNC: 28 MMOL/L (ref 21–32)
CREAT SERPL-MCNC: 1.16 MG/DL (ref 0.5–1.05)
EGFRCR SERPLBLD CKD-EPI 2021: 50 ML/MIN/1.73M*2
GLUCOSE SERPL-MCNC: 98 MG/DL (ref 74–99)
PHOSPHATE SERPL-MCNC: 3.7 MG/DL (ref 2.5–4.9)
POTASSIUM SERPL-SCNC: 4.9 MMOL/L (ref 3.5–5.3)
SODIUM SERPL-SCNC: 140 MMOL/L (ref 136–145)
URATE SERPL-MCNC: 7.5 MG/DL (ref 2.3–6.7)

## 2024-07-26 PROCEDURE — 82043 UR ALBUMIN QUANTITATIVE: CPT

## 2024-07-26 PROCEDURE — 80069 RENAL FUNCTION PANEL: CPT

## 2024-07-26 PROCEDURE — 82570 ASSAY OF URINE CREATININE: CPT

## 2024-07-26 PROCEDURE — 84156 ASSAY OF PROTEIN URINE: CPT

## 2024-07-26 PROCEDURE — 36415 COLL VENOUS BLD VENIPUNCTURE: CPT

## 2024-07-26 PROCEDURE — 84550 ASSAY OF BLOOD/URIC ACID: CPT

## 2024-07-26 PROCEDURE — 81001 URINALYSIS AUTO W/SCOPE: CPT

## 2024-07-27 LAB
APPEARANCE UR: CLEAR
BILIRUB UR STRIP.AUTO-MCNC: NEGATIVE MG/DL
COLOR UR: ABNORMAL
CREAT UR-MCNC: 85.3 MG/DL (ref 20–320)
CREAT UR-MCNC: 85.3 MG/DL (ref 20–320)
GLUCOSE UR STRIP.AUTO-MCNC: NORMAL MG/DL
KETONES UR STRIP.AUTO-MCNC: NEGATIVE MG/DL
LEUKOCYTE ESTERASE UR QL STRIP.AUTO: ABNORMAL
MICROALBUMIN UR-MCNC: <7 MG/L
MICROALBUMIN/CREAT UR: NORMAL MG/G{CREAT}
MUCOUS THREADS #/AREA URNS AUTO: NORMAL /LPF
NITRITE UR QL STRIP.AUTO: NEGATIVE
PH UR STRIP.AUTO: 5 [PH]
PROT UR STRIP.AUTO-MCNC: NEGATIVE MG/DL
PROT UR-ACNC: 6 MG/DL (ref 5–24)
PROT/CREAT UR: 0.07 MG/MG CREAT (ref 0–0.17)
RBC # UR STRIP.AUTO: NEGATIVE /UL
RBC #/AREA URNS AUTO: NORMAL /HPF
SP GR UR STRIP.AUTO: 1.02
SQUAMOUS #/AREA URNS AUTO: NORMAL /HPF
UROBILINOGEN UR STRIP.AUTO-MCNC: NORMAL MG/DL
WBC #/AREA URNS AUTO: NORMAL /HPF

## 2024-07-29 DIAGNOSIS — J98.6 DIAPHRAGM DYSFUNCTION: Primary | ICD-10-CM

## 2024-07-30 ENCOUNTER — APPOINTMENT (OUTPATIENT)
Dept: PHYSICAL THERAPY | Facility: CLINIC | Age: 74
End: 2024-07-30
Payer: MEDICARE

## 2024-08-01 ENCOUNTER — APPOINTMENT (OUTPATIENT)
Dept: PHYSICAL THERAPY | Facility: CLINIC | Age: 74
End: 2024-08-01
Payer: MEDICARE

## 2024-08-02 ENCOUNTER — DOCUMENTATION (OUTPATIENT)
Dept: PHYSICAL THERAPY | Facility: CLINIC | Age: 74
End: 2024-08-02
Payer: MEDICARE

## 2024-08-02 NOTE — PROGRESS NOTES
Physical Therapy    Discharge Summary    Name: Ondina Simmons  MRN: [id number]  : 1950  Date: 24    Discharge Summary: PT    Discharge Information: Date of discharge 2024, Date of last visit 2024, Date of evaluation 2024, Number of attended visits 1, Referred by Dr. Valerio, and Referred for lymphedema    Therapy Summary: Pt only attended one PT visit.  Did not return for further treatment    Discharge Status: goals not met     Rehab Discharge Reason: Failed to schedule and/or keep follow-up appointment(s)

## 2024-08-06 ENCOUNTER — APPOINTMENT (OUTPATIENT)
Dept: PHYSICAL THERAPY | Facility: CLINIC | Age: 74
End: 2024-08-06
Payer: MEDICARE

## 2024-08-09 ENCOUNTER — APPOINTMENT (OUTPATIENT)
Dept: PHYSICAL THERAPY | Facility: CLINIC | Age: 74
End: 2024-08-09
Payer: MEDICARE

## 2024-08-12 NOTE — PROGRESS NOTES
8/13/2024 CC: 73 y.o. presents for FU, CAC, c/o irritation OS>OD.    Past ocular history: OS trauma age of 7, PK 25 yrs ago, 2ry intraocular lens (IOL) 15 yrs, CAC  Family history: no family history of glaucoma   Past medical history: HTN, Asthma (steroid inhaler for 3 yrs)  Social history: denies tobacco     Eye medications:   Both eyes: ATs    Allergy:  As per chart    Testing:    HVF 24-2 5/1/2024: OD full, MD -0.7, OS gen depression, MD -5.5 dB    OCT 3/6/2023: OD bdl S, avg 86, OS full, avg 85 um    OCT 8/13/2024: OD bdl SN, avg 81. 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 GS OS, no progression on testing, blepharitis.    Eye medications:   Both eyes: Continue PFATs, start WC bid    Return in 3 months as scheduled.

## 2024-08-13 ENCOUNTER — HOSPITAL ENCOUNTER (OUTPATIENT)
Dept: RADIOLOGY | Facility: CLINIC | Age: 74
Discharge: HOME | End: 2024-08-13
Payer: MEDICARE

## 2024-08-13 ENCOUNTER — APPOINTMENT (OUTPATIENT)
Dept: OPHTHALMOLOGY | Facility: CLINIC | Age: 74
End: 2024-08-13
Payer: MEDICARE

## 2024-08-13 DIAGNOSIS — J98.6 DIAPHRAGM DYSFUNCTION: ICD-10-CM

## 2024-08-13 DIAGNOSIS — H40.2221 CHRONIC ANGLE-CLOSURE GLAUCOMA, MILD STAGE, LEFT: Primary | ICD-10-CM

## 2024-08-13 PROCEDURE — 76514 ECHO EXAM OF EYE THICKNESS: CPT | Performed by: OPHTHALMOLOGY

## 2024-08-13 PROCEDURE — 92133 CPTRZD OPH DX IMG PST SGM ON: CPT | Performed by: OPHTHALMOLOGY

## 2024-08-13 PROCEDURE — 71046 X-RAY EXAM CHEST 2 VIEWS: CPT | Performed by: RADIOLOGY

## 2024-08-13 PROCEDURE — 71046 X-RAY EXAM CHEST 2 VIEWS: CPT

## 2024-08-13 PROCEDURE — 1159F MED LIST DOCD IN RCRD: CPT | Performed by: OPHTHALMOLOGY

## 2024-08-13 PROCEDURE — 99213 OFFICE O/P EST LOW 20 MIN: CPT | Performed by: OPHTHALMOLOGY

## 2024-08-13 PROCEDURE — 92020 GONIOSCOPY: CPT | Performed by: OPHTHALMOLOGY

## 2024-08-13 ASSESSMENT — VISUAL ACUITY
OD_CC: 20/20
OS_SC: 20/200
CORRECTION_TYPE: GLASSES
OS_CC+: -2
OS_CC: 20/30
OD_SC: 20/30
METHOD: SNELLEN - LINEAR

## 2024-08-13 ASSESSMENT — ENCOUNTER SYMPTOMS
PSYCHIATRIC NEGATIVE: 0
GASTROINTESTINAL NEGATIVE: 0
ALLERGIC/IMMUNOLOGIC NEGATIVE: 0
ENDOCRINE NEGATIVE: 0
MUSCULOSKELETAL NEGATIVE: 0
CONSTITUTIONAL NEGATIVE: 0
NEUROLOGICAL NEGATIVE: 0
HEMATOLOGIC/LYMPHATIC NEGATIVE: 0
CARDIOVASCULAR NEGATIVE: 0
EYES NEGATIVE: 0
RESPIRATORY NEGATIVE: 0

## 2024-08-13 ASSESSMENT — REFRACTION_WEARINGRX
OS_CYLINDER: -5.75
OD_ADD: +3.00
OS_SPHERE: +2.25
OD_SPHERE: -1.75
OS_AXIS: 030
OS_ADD: +3.00

## 2024-08-13 ASSESSMENT — CONF VISUAL FIELD
OD_SUPERIOR_TEMPORAL_RESTRICTION: 0
OS_SUPERIOR_NASAL_RESTRICTION: 0
OD_NORMAL: 1
OS_INFERIOR_TEMPORAL_RESTRICTION: 0
OD_INFERIOR_TEMPORAL_RESTRICTION: 0
OS_INFERIOR_NASAL_RESTRICTION: 0
OD_INFERIOR_NASAL_RESTRICTION: 0
OS_NORMAL: 1
OS_SUPERIOR_TEMPORAL_RESTRICTION: 0
OD_SUPERIOR_NASAL_RESTRICTION: 0

## 2024-08-13 ASSESSMENT — GONIOSCOPY
OD_NASAL: SS
OD_INFERIOR: SS
OD_TEMPORAL: PTM
OD_SUPERIOR: PTM

## 2024-08-13 ASSESSMENT — SLIT LAMP EXAM - LIDS
COMMENTS: GOOD POSITION
COMMENTS: GOOD POSITION

## 2024-08-13 ASSESSMENT — TONOMETRY
OS_IOP_MMHG: 18
OD_IOP_MMHG: 15
IOP_METHOD: TONOPEN
OD_IOP_MMHG: 14
OS_IOP_MMHG: 14
IOP_METHOD: GOLDMANN APPLANATION

## 2024-08-13 ASSESSMENT — EXTERNAL EXAM - RIGHT EYE: OD_EXAM: NORMAL

## 2024-08-13 ASSESSMENT — PACHYMETRY
OD_CT(UM): 556
OS_CT(UM): 623

## 2024-08-13 ASSESSMENT — CUP TO DISC RATIO
OD_RATIO: .3
OS_RATIO: .45

## 2024-08-13 ASSESSMENT — EXTERNAL EXAM - LEFT EYE: OS_EXAM: NORMAL

## 2024-09-03 ENCOUNTER — APPOINTMENT (OUTPATIENT)
Dept: OPHTHALMOLOGY | Facility: CLINIC | Age: 74
End: 2024-09-03
Payer: MEDICARE

## 2024-09-03 NOTE — TELECONSULT
I last saw Ondina in [facility] in May.   She has elevated right HD. She was implanted with DP April 2022 and she used device full time until May 16, 2024. She had no change in symptoms or EMG or CXR during this time. She stopped using pacer in May and is now scheduled for electrode removal Wednesday Sept 4th 2024.   I had phone conversation this AM.   She is currently at work - speech is clear, no cognitive deficits. She denies any changes in medical condition but she did state she is no longer on lisinopril. She continues with hydrochlorothiazide. This is her only medication.   There is no change in her breathing function since she stopped pacing. CXR done in August that is very similar to baseline CXR.   There is plan for electrode removal only. She did report some weight loss however, I do not think she is below the required BMI for plication.     If needed we can check EKG in pre op.   She has renal panel in July and CBC in June

## 2024-09-04 ENCOUNTER — HOSPITAL ENCOUNTER (OUTPATIENT)
Facility: HOSPITAL | Age: 74
Setting detail: OUTPATIENT SURGERY
Discharge: HOME | End: 2024-09-04
Attending: SURGERY | Admitting: SURGERY
Payer: MEDICARE

## 2024-09-04 ENCOUNTER — ANESTHESIA EVENT (OUTPATIENT)
Dept: OPERATING ROOM | Facility: HOSPITAL | Age: 74
End: 2024-09-04
Payer: MEDICARE

## 2024-09-04 ENCOUNTER — ANESTHESIA (OUTPATIENT)
Dept: OPERATING ROOM | Facility: HOSPITAL | Age: 74
End: 2024-09-04
Payer: MEDICARE

## 2024-09-04 VITALS
WEIGHT: 240.3 LBS | DIASTOLIC BLOOD PRESSURE: 89 MMHG | RESPIRATION RATE: 16 BRPM | TEMPERATURE: 97.2 F | OXYGEN SATURATION: 96 % | BODY MASS INDEX: 42.58 KG/M2 | HEART RATE: 65 BPM | SYSTOLIC BLOOD PRESSURE: 153 MMHG | HEIGHT: 63 IN

## 2024-09-04 DIAGNOSIS — R06.09 DYSPNEA ON EXERTION: ICD-10-CM

## 2024-09-04 DIAGNOSIS — J98.6 DIAPHRAGMATIC PARALYSIS: Primary | ICD-10-CM

## 2024-09-04 DIAGNOSIS — J98.6 DIAPHRAGM DYSFUNCTION: ICD-10-CM

## 2024-09-04 PROCEDURE — 2500000005 HC RX 250 GENERAL PHARMACY W/O HCPCS: Performed by: SURGERY

## 2024-09-04 PROCEDURE — 2500000005 HC RX 250 GENERAL PHARMACY W/O HCPCS: Performed by: ANESTHESIOLOGY

## 2024-09-04 PROCEDURE — 88300 SURGICAL PATH GROSS: CPT | Performed by: STUDENT IN AN ORGANIZED HEALTH CARE EDUCATION/TRAINING PROGRAM

## 2024-09-04 PROCEDURE — 2720000007 HC OR 272 NO HCPCS: Performed by: SURGERY

## 2024-09-04 PROCEDURE — 7100000002 HC RECOVERY ROOM TIME - EACH INCREMENTAL 1 MINUTE: Performed by: SURGERY

## 2024-09-04 PROCEDURE — 3600000008 HC OR TIME - EACH INCREMENTAL 1 MINUTE - PROCEDURE LEVEL THREE: Performed by: SURGERY

## 2024-09-04 PROCEDURE — 2500000004 HC RX 250 GENERAL PHARMACY W/ HCPCS (ALT 636 FOR OP/ED): Performed by: ANESTHESIOLOGY

## 2024-09-04 PROCEDURE — 49320 DIAG LAPARO SEPARATE PROC: CPT | Performed by: SURGERY

## 2024-09-04 PROCEDURE — 7100000001 HC RECOVERY ROOM TIME - INITIAL BASE CHARGE: Performed by: SURGERY

## 2024-09-04 PROCEDURE — 2500000004 HC RX 250 GENERAL PHARMACY W/ HCPCS (ALT 636 FOR OP/ED)

## 2024-09-04 PROCEDURE — 95972 ALYS CPLX SP/PN NPGT W/PRGRM: CPT | Performed by: SURGERY

## 2024-09-04 PROCEDURE — 7100000009 HC PHASE TWO TIME - INITIAL BASE CHARGE: Performed by: SURGERY

## 2024-09-04 PROCEDURE — 3700000001 HC GENERAL ANESTHESIA TIME - INITIAL BASE CHARGE: Performed by: SURGERY

## 2024-09-04 PROCEDURE — 3700000002 HC GENERAL ANESTHESIA TIME - EACH INCREMENTAL 1 MINUTE: Performed by: SURGERY

## 2024-09-04 PROCEDURE — 3600000003 HC OR TIME - INITIAL BASE CHARGE - PROCEDURE LEVEL THREE: Performed by: SURGERY

## 2024-09-04 PROCEDURE — 88300 SURGICAL PATH GROSS: CPT | Mod: TC,SUR | Performed by: NURSE PRACTITIONER

## 2024-09-04 PROCEDURE — 7100000010 HC PHASE TWO TIME - EACH INCREMENTAL 1 MINUTE: Performed by: SURGERY

## 2024-09-04 PROCEDURE — 96372 THER/PROPH/DIAG INJ SC/IM: CPT

## 2024-09-04 PROCEDURE — 2500000005 HC RX 250 GENERAL PHARMACY W/O HCPCS

## 2024-09-04 RX ORDER — PROPOFOL 10 MG/ML
INJECTION, EMULSION INTRAVENOUS AS NEEDED
Status: DISCONTINUED | OUTPATIENT
Start: 2024-09-04 | End: 2024-09-04

## 2024-09-04 RX ORDER — ALBUTEROL SULFATE 0.83 MG/ML
2.5 SOLUTION RESPIRATORY (INHALATION) ONCE AS NEEDED
Status: DISCONTINUED | OUTPATIENT
Start: 2024-09-04 | End: 2024-09-04 | Stop reason: HOSPADM

## 2024-09-04 RX ORDER — BUPIVACAINE HCL/EPINEPHRINE 0.5-1:200K
VIAL (ML) INJECTION AS NEEDED
Status: DISCONTINUED | OUTPATIENT
Start: 2024-09-04 | End: 2024-09-04 | Stop reason: HOSPADM

## 2024-09-04 RX ORDER — ONDANSETRON HYDROCHLORIDE 2 MG/ML
INJECTION, SOLUTION INTRAVENOUS AS NEEDED
Status: DISCONTINUED | OUTPATIENT
Start: 2024-09-04 | End: 2024-09-04

## 2024-09-04 RX ORDER — FENTANYL CITRATE 50 UG/ML
INJECTION, SOLUTION INTRAMUSCULAR; INTRAVENOUS AS NEEDED
Status: DISCONTINUED | OUTPATIENT
Start: 2024-09-04 | End: 2024-09-04

## 2024-09-04 RX ORDER — DROPERIDOL 2.5 MG/ML
0.62 INJECTION, SOLUTION INTRAMUSCULAR; INTRAVENOUS ONCE AS NEEDED
Status: DISCONTINUED | OUTPATIENT
Start: 2024-09-04 | End: 2024-09-04 | Stop reason: HOSPADM

## 2024-09-04 RX ORDER — CEFAZOLIN 1 G/1
INJECTION, POWDER, FOR SOLUTION INTRAVENOUS AS NEEDED
Status: DISCONTINUED | OUTPATIENT
Start: 2024-09-04 | End: 2024-09-04

## 2024-09-04 RX ORDER — SODIUM CHLORIDE, SODIUM LACTATE, POTASSIUM CHLORIDE, CALCIUM CHLORIDE 600; 310; 30; 20 MG/100ML; MG/100ML; MG/100ML; MG/100ML
INJECTION, SOLUTION INTRAVENOUS CONTINUOUS PRN
Status: DISCONTINUED | OUTPATIENT
Start: 2024-09-04 | End: 2024-09-04

## 2024-09-04 RX ORDER — SODIUM CHLORIDE 0.9 G/100ML
IRRIGANT IRRIGATION AS NEEDED
Status: DISCONTINUED | OUTPATIENT
Start: 2024-09-04 | End: 2024-09-04 | Stop reason: HOSPADM

## 2024-09-04 RX ORDER — OXYCODONE HYDROCHLORIDE 5 MG/1
5 TABLET ORAL EVERY 4 HOURS PRN
Status: DISCONTINUED | OUTPATIENT
Start: 2024-09-04 | End: 2024-09-04 | Stop reason: HOSPADM

## 2024-09-04 RX ORDER — PHENYLEPHRINE HCL IN 0.9% NACL 0.4MG/10ML
SYRINGE (ML) INTRAVENOUS AS NEEDED
Status: DISCONTINUED | OUTPATIENT
Start: 2024-09-04 | End: 2024-09-04

## 2024-09-04 RX ORDER — LIDOCAINE HYDROCHLORIDE 20 MG/ML
INJECTION, SOLUTION INFILTRATION; PERINEURAL AS NEEDED
Status: DISCONTINUED | OUTPATIENT
Start: 2024-09-04 | End: 2024-09-04

## 2024-09-04 RX ORDER — LIDOCAINE HYDROCHLORIDE 10 MG/ML
0.1 INJECTION, SOLUTION EPIDURAL; INFILTRATION; INTRACAUDAL; PERINEURAL ONCE
Status: DISCONTINUED | OUTPATIENT
Start: 2024-09-04 | End: 2024-09-04 | Stop reason: HOSPADM

## 2024-09-04 RX ORDER — HYDROCODONE BITARTRATE AND ACETAMINOPHEN 5; 325 MG/1; MG/1
1 TABLET ORAL EVERY 6 HOURS PRN
Qty: 12 TABLET | Refills: 0 | Status: SHIPPED | OUTPATIENT
Start: 2024-09-04 | End: 2024-09-11

## 2024-09-04 RX ORDER — SODIUM CHLORIDE, SODIUM LACTATE, POTASSIUM CHLORIDE, CALCIUM CHLORIDE 600; 310; 30; 20 MG/100ML; MG/100ML; MG/100ML; MG/100ML
100 INJECTION, SOLUTION INTRAVENOUS CONTINUOUS
Status: DISCONTINUED | OUTPATIENT
Start: 2024-09-04 | End: 2024-09-04 | Stop reason: HOSPADM

## 2024-09-04 RX ORDER — DROPERIDOL 2.5 MG/ML
INJECTION, SOLUTION INTRAMUSCULAR; INTRAVENOUS AS NEEDED
Status: DISCONTINUED | OUTPATIENT
Start: 2024-09-04 | End: 2024-09-04

## 2024-09-04 RX ORDER — HYDROMORPHONE HYDROCHLORIDE 1 MG/ML
0.2 INJECTION, SOLUTION INTRAMUSCULAR; INTRAVENOUS; SUBCUTANEOUS EVERY 5 MIN PRN
Status: DISCONTINUED | OUTPATIENT
Start: 2024-09-04 | End: 2024-09-04 | Stop reason: HOSPADM

## 2024-09-04 RX ORDER — MIDAZOLAM HYDROCHLORIDE 1 MG/ML
INJECTION INTRAMUSCULAR; INTRAVENOUS AS NEEDED
Status: DISCONTINUED | OUTPATIENT
Start: 2024-09-04 | End: 2024-09-04

## 2024-09-04 RX ORDER — LABETALOL HYDROCHLORIDE 5 MG/ML
5 INJECTION, SOLUTION INTRAVENOUS ONCE AS NEEDED
Status: DISCONTINUED | OUTPATIENT
Start: 2024-09-04 | End: 2024-09-04 | Stop reason: HOSPADM

## 2024-09-04 RX ORDER — MIDAZOLAM HYDROCHLORIDE 1 MG/ML
1 INJECTION INTRAMUSCULAR; INTRAVENOUS ONCE AS NEEDED
Status: DISCONTINUED | OUTPATIENT
Start: 2024-09-04 | End: 2024-09-04 | Stop reason: HOSPADM

## 2024-09-04 RX ORDER — HYDROMORPHONE HYDROCHLORIDE 1 MG/ML
0.5 INJECTION, SOLUTION INTRAMUSCULAR; INTRAVENOUS; SUBCUTANEOUS EVERY 5 MIN PRN
Status: DISCONTINUED | OUTPATIENT
Start: 2024-09-04 | End: 2024-09-04 | Stop reason: HOSPADM

## 2024-09-04 RX ORDER — METHOCARBAMOL 100 MG/ML
1000 INJECTION, SOLUTION INTRAMUSCULAR; INTRAVENOUS ONCE
Status: COMPLETED | OUTPATIENT
Start: 2024-09-04 | End: 2024-09-04

## 2024-09-04 RX ORDER — MEPERIDINE HYDROCHLORIDE 25 MG/ML
12.5 INJECTION INTRAMUSCULAR; INTRAVENOUS; SUBCUTANEOUS EVERY 10 MIN PRN
Status: DISCONTINUED | OUTPATIENT
Start: 2024-09-04 | End: 2024-09-04 | Stop reason: HOSPADM

## 2024-09-04 ASSESSMENT — PAIN - FUNCTIONAL ASSESSMENT
PAIN_FUNCTIONAL_ASSESSMENT: 0-10
PAIN_FUNCTIONAL_ASSESSMENT: 0-10
PAIN_FUNCTIONAL_ASSESSMENT: UNABLE TO SELF-REPORT
PAIN_FUNCTIONAL_ASSESSMENT: 0-10
PAIN_FUNCTIONAL_ASSESSMENT: 0-10
PAIN_FUNCTIONAL_ASSESSMENT: UNABLE TO SELF-REPORT
PAIN_FUNCTIONAL_ASSESSMENT: 0-10
PAIN_FUNCTIONAL_ASSESSMENT: UNABLE TO SELF-REPORT
PAIN_FUNCTIONAL_ASSESSMENT: 0-10

## 2024-09-04 ASSESSMENT — COLUMBIA-SUICIDE SEVERITY RATING SCALE - C-SSRS
1. IN THE PAST MONTH, HAVE YOU WISHED YOU WERE DEAD OR WISHED YOU COULD GO TO SLEEP AND NOT WAKE UP?: NO
2. HAVE YOU ACTUALLY HAD ANY THOUGHTS OF KILLING YOURSELF?: NO
6. HAVE YOU EVER DONE ANYTHING, STARTED TO DO ANYTHING, OR PREPARED TO DO ANYTHING TO END YOUR LIFE?: NO

## 2024-09-04 ASSESSMENT — PAIN SCALES - GENERAL
PAINLEVEL_OUTOF10: 4
PAINLEVEL_OUTOF10: 3
PAINLEVEL_OUTOF10: 3
PAINLEVEL_OUTOF10: 0 - NO PAIN
PAINLEVEL_OUTOF10: 0 - NO PAIN
PAIN_LEVEL: 4
PAINLEVEL_OUTOF10: 5 - MODERATE PAIN
PAINLEVEL_OUTOF10: 2
PAINLEVEL_OUTOF10: 2
PAINLEVEL_OUTOF10: 0 - NO PAIN

## 2024-09-04 ASSESSMENT — PAIN DESCRIPTION - LOCATION: LOCATION: ABDOMEN

## 2024-09-04 NOTE — ANESTHESIA POSTPROCEDURE EVALUATION
Patient: Ondina Simmons    Procedure Summary       Date: 09/04/24 Room / Location: [facility] OR 01 / Virtual [facility] OR    Anesthesia Start: 0810 Anesthesia Stop: 0905    Procedure: Removal Diaphragm Pacing Wires (Abdomen) Diagnosis:       Diaphragm dysfunction      (Diaphragm dysfunction [J98.6])    Surgeons: Kaiser Parikh MD Responsible Provider: Edu Childress MD    Anesthesia Type: general ASA Status: 3            Anesthesia Type: general    Vitals Value Taken Time   /82 09/04/24 0902   Temp 36.4 09/04/24 0906   Pulse 76 09/04/24 0904   Resp 14 09/04/24 0904   SpO2 100 % 09/04/24 0904   Vitals shown include unfiled device data.    Anesthesia Post Evaluation    Patient location during evaluation: PACU  Patient participation: complete - patient participated  Level of consciousness: awake  Pain score: 4  Pain management: adequate  Multimodal analgesia pain management approach  Airway patency: patent  Two or more strategies used to mitigate risk of obstructive sleep apnea  Cardiovascular status: acceptable  Respiratory status: acceptable, airway suctioned and face mask  Hydration status: acceptable  Postoperative Nausea and Vomiting: none      No notable events documented.

## 2024-09-04 NOTE — ANESTHESIA PREPROCEDURE EVALUATION
Patient: Ondina Simmons    Procedure Information       Anesthesia Start Date/Time: 09/04/24 0810    Procedure: Exploration Laparoscopy (Abdomen) - removal of DP electrodes    Location: [facility] OR  / Virtual [facility] OR    Surgeons: Kaiser Parikh MD            Relevant Problems   Cardiac   (+) Angina pectoris (CMS-HCC)   (+) Chest pain   (+) Essential hypertension   (+) Hyperlipidemia      Pulmonary   (+) Asthma (HHS-HCC)   (+) Dyspnea on exertion   (+) Mild intermittent asthma without complication (HHS-HCC)   (+) Obstructive sleep apnea      Neuro   (+) Depression with anxiety   (+) Left carpal tunnel syndrome   (+) Recurrent major depressive disorder, in partial remission (CMS-HCC)   (+) Right carpal tunnel syndrome      GI   (+) Esophageal reflux      /Renal   (+) Chronic renal impairment, stage 3 (moderate) (Multi)   (+) Chronic renal insufficiency      Endocrine   (+) Morbid obesity (Multi)   (+) Primary hyperparathyroidism (Multi)      Musculoskeletal   (+) Degeneration of intervertebral disc of lumbar region   (+) Left carpal tunnel syndrome   (+) Localized osteoarthritis of left hand   (+) Osteoarthritis of left knee   (+) Osteoarthritis of right knee   (+) Primary osteoarthritis, right hand   (+) Right carpal tunnel syndrome   (+) Spondylosis of lumbar spine   (+) Thoracic degenerative disc disease      ID   (+) Herpes zoster      Skin   (+) Eczema       Clinical information reviewed:    Allergies  Meds     OB Status           NPO Detail:  NPO/Void Status  Date of Last Liquid: 09/03/24  Time of Last Liquid: 1800  Date of Last Solid: 09/03/24  Time of Last Solid: 1800         Physical Exam    Airway  Mallampati: III     Cardiovascular - normal exam  Rhythm: regular  Rate: normal     Dental    Pulmonary - normal exam     Abdominal - normal exam         Anesthesia Plan    History of general anesthesia?: yes  History of complications of general anesthesia?: no    ASA 3     general     Anesthetic  plan and risks discussed with patient.

## 2024-09-04 NOTE — ANESTHESIA PROCEDURE NOTES
Airway  Date/Time: 9/4/2024 8:28 AM  Urgency: elective    Airway not difficult    Staffing  Performed: JEWELS   Authorized by: Edu Childress MD    Performed by: JEWELS Oconnell  Patient location during procedure: OR    Indications and Patient Condition  Indications for airway management: anesthesia  Spontaneous Ventilation: absent  Sedation level: deep  Preoxygenated: yes  Patient position: sniffing  MILS maintained throughout  Mask difficulty assessment: 1 - vent by mask    Final Airway Details  Final airway type: endotracheal airway      Successful airway: ETT  Cuffed: yes   Successful intubation technique: video laryngoscopy  Facilitating devices/methods: intubating stylet  Endotracheal tube insertion site: oral  Blade: Segundo  Blade size: #3  ETT size (mm): 7.0  Cormack-Lehane Classification: grade I - full view of glottis  Placement verified by: chest auscultation and capnometry   Measured from: lips  ETT to lips (cm): 21  Number of attempts at approach: 1

## 2024-09-04 NOTE — OP NOTE
Removal Diaphragm Pacing Wires Operative Note     Date: 2024  OR Location: [facility] OR    Name: Ondina Simmons, : 1950, Age: 73 y.o., MRN: [id number], Sex: female    Diagnosis  Pre-op Diagnosis      * Diaphragm dysfunction [J98.6] Post-op Diagnosis     * Diaphragm dysfunction [J98.6]     Procedures  Removal Diaphragm Pacing Wires  26030 - [place] LAPS ABD PRTM&OMENTUM DX W/WO SPEC BR/WA SPX    Removal Diaphragm Pacing Wires  84050 - [place] LAPS ABD PRTM&OMENTUM DX W/WO SPEC BR/WA SPX      Surgeons      * Kaiser Parikh - Primary    Resident/Fellow/Other Assistant:  Surgeons and Role:  * No surgeons found with a matching role *    Procedure Summary  Anesthesia: General  ASA: III  Anesthesia Staff: Anesthesiologist: Edu Childress MD  Estimated Blood Loss: 5mL  Intra-op Medications: Administrations occurring from 0905 to 1010 on 24:  * No intraprocedure medications in log *        Specimen:   ID Type Source Tests Collected by Time   1 : DIAPHRAGM PACING WIRES Tissue DEVICE SURGICAL PATHOLOGY EXAM Kaiser Parikh MD 2024 0834        Staff:   Circulator: Marla Fergusonub Person: Hanna         Drains and/or Catheters: * None in log *    Tourniquet Times:         Implants:     Findings: There was some recovery of right diaphragm. Removal of all pacing wires    Indications: Ondina Simmons is an 73 y.o. female who is having surgery for Diaphragm dysfunction [J98.6].     The patient was seen in the preoperative area. The risks, benefits, complications, treatment options, non-operative alternatives, expected recovery and outcomes were discussed with the patient. The possibilities of reaction to medication, pulmonary aspiration, injury to surrounding structures, bleeding, recurrent infection, the need for additional procedures, failure to diagnose a condition, and creating a complication requiring transfusion or operation were discussed with the patient. The patient concurred with the proposed plan,  giving informed consent.  The site of surgery was properly noted/marked if necessary per policy. The patient has been actively warmed in preoperative area. Preoperative antibiotics have been ordered and given within 1 hours of incision. Venous thrombosis prophylaxis have been ordered including bilateral sequential compression devices    Procedure Details: Patient brought to operating room.  I did diaphragm EMG analysis.  She had EMG activity on the left very little on the right.  I then prepped and draped her abdomen.  Using Moody technique I entered insufflate her abdomen.  I then hooked up the stimulator and I could see that she did have some recovery of right diaphragm.  There is obviously not a strong his left diaphragm but when stimulating the right we had cut downward contraction.  I just stimulated the left there is no evidence of paradoxical movement on the right.  This again on maximal stimulation of the left.  Showing that we had recovered some diaphragm function on the right.  Again he has multiple variables in the high amplitude to do this analysis.  This point in time I then placed 2 5 Pritchard trocars.  I removed all of her electrodes in their entirety.  I then remove my trocars.  Closed the fascial defect at her umbilicus with 0 Vicryl. Closed skine with 4-0 Vicryl.  Complications:  None; patient tolerated the procedure well.    Disposition: PACU - hemodynamically stable.  Condition: stable         Attending Attestation: I was present and scrubbed for the entire procedure.    Kaiser Parikh  Phone Number: [phone]

## 2024-09-04 NOTE — H&P
History Of Present Illness  Ondina Simmons is a 73 y.o. female presenting with DIAPHRAGM dysfunction.  Had previous diaphragm pacing which after two years did not help. Will plan on removing her electrodes. Because of her weight will not do diaphragm plication.     Past Medical History  She has a past medical history of Acute on chronic diastolic heart failure (Multi) (09/13/2023), Angina pectoris (CMS-HCC) (09/13/2023), Aortic dilatation (CMS-HCC) (09/13/2023), Chest pain (09/13/2023), Diastolic dysfunction (09/13/2023), Dyslipidemia (09/13/2023), Dyspnea on exertion (09/13/2023), Encounter for gynecological examination (general) (routine) without abnormal findings, Encounter for screening mammogram for malignant neoplasm of breast, Essential hypertension (09/13/2023), Hyperlipidemia (09/13/2023), Hyperlipidemia, unspecified (04/12/2018), Hyperlipidemia, unspecified, Left arm pain, Left hand pain, Lymphedema, Morbid (severe) obesity due to excess calories (Multi), Other conditions influencing health status, Personal history of other diseases of the circulatory system, and Personal history of other diseases of the digestive system.    Surgical History  She has a past surgical history that includes Colonoscopy (06/07/2013) and Other surgical history (02/10/2014).     Social History  She reports that she has never smoked. She has never been exposed to tobacco smoke. She has never used smokeless tobacco. She reports current alcohol use of about 1.0 standard drink of alcohol per week. She reports that she does not use drugs.    Family History  Family History   Problem Relation Name Age of Onset    Diabetes Mother      Heart disease Mother      Cancer Father      Cancer Sister      Breast cancer Mother's Sister      Breast cancer Other family history     Diabetes Other family history     Heart disease Other family history     Other (htn) Other family history         Allergies  Ace inhibitors, Amlodipine besylate, and  "Losartan    Review of Systems     Physical Exam  Awake and alert. Respirations stable. Abdomen incisisions present. Also has electrode     Last Recorded Vitals  Blood pressure (!) 182/92, pulse 74, temperature 36.4 °C (97.5 °F), temperature source Temporal, resp. rate 18, height 1.6 m (5' 3\"), weight 109 kg (240 lb 4.8 oz), SpO2 96%.       Assessment/Plan   Assessment & Plan  Diaphragm dysfunction      Pland on removal of electrodes laparoscopicallyl      Kaiser Parikh MD    "

## 2024-09-06 LAB
LABORATORY COMMENT REPORT: NORMAL
PATH REPORT.FINAL DX SPEC: NORMAL
PATH REPORT.GROSS SPEC: NORMAL
PATH REPORT.RELEVANT HX SPEC: NORMAL
PATH REPORT.TOTAL CANCER: NORMAL

## 2024-09-24 ENCOUNTER — LAB (OUTPATIENT)
Dept: LAB | Facility: LAB | Age: 74
End: 2024-09-24
Payer: MEDICARE

## 2024-09-24 DIAGNOSIS — E87.5 HYPERKALEMIA: ICD-10-CM

## 2024-09-24 DIAGNOSIS — N18.30 CHRONIC KIDNEY DISEASE, STAGE 3 UNSPECIFIED (MULTI): Primary | ICD-10-CM

## 2024-09-24 DIAGNOSIS — M10.00 IDIOPATHIC GOUT, UNSPECIFIED SITE: ICD-10-CM

## 2024-09-24 LAB
ALBUMIN SERPL BCP-MCNC: 4.3 G/DL (ref 3.4–5)
ANION GAP SERPL CALC-SCNC: 16 MMOL/L (ref 10–20)
BUN SERPL-MCNC: 36 MG/DL (ref 6–23)
CALCIUM SERPL-MCNC: 9.9 MG/DL (ref 8.6–10.6)
CHLORIDE SERPL-SCNC: 100 MMOL/L (ref 98–107)
CO2 SERPL-SCNC: 27 MMOL/L (ref 21–32)
CREAT SERPL-MCNC: 1.27 MG/DL (ref 0.5–1.05)
EGFRCR SERPLBLD CKD-EPI 2021: 45 ML/MIN/1.73M*2
GLUCOSE SERPL-MCNC: 105 MG/DL (ref 74–99)
PHOSPHATE SERPL-MCNC: 4.7 MG/DL (ref 2.5–4.9)
POTASSIUM SERPL-SCNC: 5.2 MMOL/L (ref 3.5–5.3)
SODIUM SERPL-SCNC: 138 MMOL/L (ref 136–145)

## 2024-09-24 PROCEDURE — 36415 COLL VENOUS BLD VENIPUNCTURE: CPT

## 2024-09-24 PROCEDURE — 80069 RENAL FUNCTION PANEL: CPT

## 2024-09-27 ENCOUNTER — LAB (OUTPATIENT)
Dept: LAB | Facility: LAB | Age: 74
End: 2024-09-27
Payer: MEDICARE

## 2024-09-27 DIAGNOSIS — E55.9 VITAMIN D DEFICIENCY, UNSPECIFIED: ICD-10-CM

## 2024-09-27 DIAGNOSIS — E03.9 HYPOTHYROIDISM, UNSPECIFIED: ICD-10-CM

## 2024-09-27 DIAGNOSIS — D64.9 ANEMIA, UNSPECIFIED: Primary | ICD-10-CM

## 2024-09-27 LAB
25(OH)D3 SERPL-MCNC: 33 NG/ML (ref 30–100)
BASOPHILS # BLD AUTO: 0.05 X10*3/UL (ref 0–0.1)
BASOPHILS NFR BLD AUTO: 0.8 %
EOSINOPHIL # BLD AUTO: 0.21 X10*3/UL (ref 0–0.4)
EOSINOPHIL NFR BLD AUTO: 3.2 %
ERYTHROCYTE [DISTWIDTH] IN BLOOD BY AUTOMATED COUNT: 12.9 % (ref 11.5–14.5)
FERRITIN SERPL-MCNC: 315 NG/ML (ref 8–150)
FOLATE SERPL-MCNC: 13.9 NG/ML
HCT VFR BLD AUTO: 40.9 % (ref 36–46)
HGB BLD-MCNC: 12.9 G/DL (ref 12–16)
IMM GRANULOCYTES # BLD AUTO: 0.01 X10*3/UL (ref 0–0.5)
IMM GRANULOCYTES NFR BLD AUTO: 0.2 % (ref 0–0.9)
IRON SATN MFR SERPL: 22 % (ref 25–45)
IRON SERPL-MCNC: 70 UG/DL (ref 35–150)
LYMPHOCYTES # BLD AUTO: 1.47 X10*3/UL (ref 0.8–3)
LYMPHOCYTES NFR BLD AUTO: 22.7 %
MCH RBC QN AUTO: 29.5 PG (ref 26–34)
MCHC RBC AUTO-ENTMCNC: 31.5 G/DL (ref 32–36)
MCV RBC AUTO: 94 FL (ref 80–100)
MONOCYTES # BLD AUTO: 0.79 X10*3/UL (ref 0.05–0.8)
MONOCYTES NFR BLD AUTO: 12.2 %
NEUTROPHILS # BLD AUTO: 3.94 X10*3/UL (ref 1.6–5.5)
NEUTROPHILS NFR BLD AUTO: 60.9 %
NRBC BLD-RTO: 0 /100 WBCS (ref 0–0)
PLATELET # BLD AUTO: 288 X10*3/UL (ref 150–450)
RBC # BLD AUTO: 4.37 X10*6/UL (ref 4–5.2)
TIBC SERPL-MCNC: 324 UG/DL (ref 240–445)
TSH SERPL-ACNC: 1.83 MIU/L (ref 0.44–3.98)
UIBC SERPL-MCNC: 254 UG/DL (ref 110–370)
VIT B12 SERPL-MCNC: 364 PG/ML (ref 211–911)
WBC # BLD AUTO: 6.5 X10*3/UL (ref 4.4–11.3)

## 2024-09-27 PROCEDURE — 83540 ASSAY OF IRON: CPT

## 2024-09-27 PROCEDURE — 82306 VITAMIN D 25 HYDROXY: CPT

## 2024-09-27 PROCEDURE — 82746 ASSAY OF FOLIC ACID SERUM: CPT

## 2024-09-27 PROCEDURE — 82728 ASSAY OF FERRITIN: CPT

## 2024-09-27 PROCEDURE — 84443 ASSAY THYROID STIM HORMONE: CPT

## 2024-09-27 PROCEDURE — 36415 COLL VENOUS BLD VENIPUNCTURE: CPT

## 2024-09-27 PROCEDURE — 82607 VITAMIN B-12: CPT

## 2024-09-27 PROCEDURE — 85025 COMPLETE CBC W/AUTO DIFF WBC: CPT

## 2024-09-27 PROCEDURE — 83550 IRON BINDING TEST: CPT

## 2024-09-30 ENCOUNTER — APPOINTMENT (OUTPATIENT)
Dept: OTOLARYNGOLOGY | Facility: CLINIC | Age: 74
End: 2024-09-30
Payer: MEDICARE

## 2024-09-30 ENCOUNTER — APPOINTMENT (OUTPATIENT)
Dept: AUDIOLOGY | Facility: CLINIC | Age: 74
End: 2024-09-30
Payer: MEDICARE

## 2024-09-30 VITALS — BODY MASS INDEX: 44.72 KG/M2 | WEIGHT: 243 LBS | HEIGHT: 62 IN

## 2024-09-30 DIAGNOSIS — H90.3 BILATERAL SENSORINEURAL HEARING LOSS: Primary | ICD-10-CM

## 2024-09-30 DIAGNOSIS — H61.21 IMPACTED CERUMEN OF RIGHT EAR: ICD-10-CM

## 2024-09-30 DIAGNOSIS — G47.33 OBSTRUCTIVE SLEEP APNEA: ICD-10-CM

## 2024-09-30 DIAGNOSIS — H90.3 SENSORINEURAL HEARING LOSS (SNHL) OF BOTH EARS: Primary | ICD-10-CM

## 2024-09-30 DIAGNOSIS — H93.13 BILATERAL TINNITUS: ICD-10-CM

## 2024-09-30 PROCEDURE — 92557 COMPREHENSIVE HEARING TEST: CPT | Performed by: AUDIOLOGIST

## 2024-09-30 PROCEDURE — 1036F TOBACCO NON-USER: CPT | Performed by: OTOLARYNGOLOGY

## 2024-09-30 PROCEDURE — 99203 OFFICE O/P NEW LOW 30 MIN: CPT | Performed by: OTOLARYNGOLOGY

## 2024-09-30 PROCEDURE — 92550 TYMPANOMETRY & REFLEX THRESH: CPT | Performed by: AUDIOLOGIST

## 2024-09-30 PROCEDURE — 3008F BODY MASS INDEX DOCD: CPT | Performed by: OTOLARYNGOLOGY

## 2024-09-30 ASSESSMENT — PAIN - FUNCTIONAL ASSESSMENT: PAIN_FUNCTIONAL_ASSESSMENT: 0-10

## 2024-09-30 ASSESSMENT — PAIN SCALES - GENERAL: PAINLEVEL_OUTOF10: 5 - MODERATE PAIN

## 2024-09-30 ASSESSMENT — ENCOUNTER SYMPTOMS: OCCASIONAL FEELINGS OF UNSTEADINESS: 0

## 2024-09-30 NOTE — PROGRESS NOTES
Chief Complaint   Patient presents with    New Patient Visit     NP- FEELS LIKE RIGHT EAR IS PLUGGED WHEN SHE WAKES UP, HAD AUDIO DONE AND USES CPAP MACHINE, PREVIOUSLY HAD LEFT NASAL POLY REMOVED YEARS AGO     HPI:  Ondina Simmons is a 73 y.o. female who presents today for evaluation for ears.  Especially the right ear.  Often times will feel plugged up at night.  She does have a history of CPAP usage for her obstructive sleep apnea.  History of nasal polyp removed on the left in the distant past.  No current sinus complaints.  Notices some hearing loss and audiogram does show some symmetric mild to moderate high-frequency sensorineural hearing loss with excellent discrimination scores.  Only occasionally gets some tinnitus.    PMH:  Past Medical History:   Diagnosis Date    Acute on chronic diastolic heart failure (Multi) 09/13/2023    Angina pectoris (CMS-[facility]) 09/13/2023    Aortic dilatation (CMS-[facility]) 09/13/2023    Chest pain 09/13/2023    COPD (chronic obstructive pulmonary disease) (Multi)     Diastolic dysfunction 09/13/2023    Dyslipidemia 09/13/2023    Dyspnea on exertion 09/13/2023    Encounter for gynecological examination (general) (routine) without abnormal findings     Encounter for routine pelvic examination    Encounter for screening mammogram for malignant neoplasm of breast     Visit for screening mammogram    Essential hypertension 09/13/2023    High blood pressure     Hyperlipidemia 09/13/2023    Hyperlipidemia, unspecified 04/12/2018    Hyperlipidemia    Hyperlipidemia, unspecified     Dyslipidemia    Left arm pain     Left hand pain     Lymphedema     Morbid (severe) obesity due to excess calories (Multi)     Morbid obesity    Other conditions influencing health status     Osteoarthritis    Personal history of other diseases of the circulatory system     History of essential hypertension    Personal history of other diseases of the digestive system     History of esophageal reflux    Sleep  apnea      Past Surgical History:   Procedure Laterality Date    CARDIAC PACEMAKER REMOVAL      COLONOSCOPY  06/07/2013    Complete Colonoscopy    OTHER SURGICAL HISTORY  02/10/2014    Cornea Transplant         Medications:     Current Outpatient Medications:     acetaminophen (Tylenol Extra Strength) 500 mg tablet, Take 1 tablet (500 mg) by mouth every 6 hours., Disp: , Rfl:     Advair Diskus 250-50 mcg/dose diskus inhaler, Inhale 1 puff every 12 hours.  RINSE AFTER EACH USE, Disp: , Rfl:     albuterol 90 mcg/actuation inhaler, Inhale 2 puffs every 4 hours if needed for shortness of breath., Disp: , Rfl:     amLODIPine (Norvasc) 10 mg tablet, Take 1 tablet (10 mg) by mouth once daily., Disp: , Rfl:     amLODIPine (Norvasc) 5 mg tablet, Take 1 tablet (5 mg) by mouth once daily., Disp: , Rfl:     aspirin 81 mg EC tablet, Aspirin EC 81 MG TBEC Refills: 0 MD Te Napoles Start: 14-Nov-2022, Disp: , Rfl:     chlorhexidine (Peridex) 0.12 % solution, Take by mouth. RINSE MOUTH WITH 1/2 OUNCE TWICE DAILY AFTER BREAKFAST AND BEFORE BEDTIME FOLLOWING BRUSHING AND FLOSSING, Disp: , Rfl:     fluticasone (Flonase) 50 mcg/actuation nasal spray, , Disp: , Rfl:     furosemide (Lasix) 20 mg tablet, Take 1 tablet (20 mg) by mouth once daily., Disp: , Rfl:     furosemide (Lasix) 40 mg tablet, Take 1 tablet (40 mg) by mouth every other day., Disp: , Rfl:     hydrALAZINE (Apresoline) 25 mg tablet, Take 1 tablet (25 mg) by mouth 2 times a day., Disp: 60 tablet, Rfl: 11    lisinopril 30 mg tablet, once every 24 hours., Disp: , Rfl:     losartan (Cozaar) 100 mg tablet, Take 1 tablet (100 mg) by mouth once daily., Disp: , Rfl:     melatonin 10 mg capsule, Take 1 capsule (10 mg) by mouth once daily at bedtime., Disp: , Rfl:     multivitamin with minerals iron-free (Centrum Silver), Take 1 tablet by mouth once daily., Disp: , Rfl:     naproxen (Naprosyn) 500 mg tablet, TAKE 1 TABLET BY MOUTH WITH FOOD TWICE A DAY, Disp: , Rfl:  "    nitroglycerin (Nitrostat) 0.4 mg SL tablet, Place 1 tablet (0.4 mg) under the tongue every 5 minutes if needed for chest pain. as directed, Disp: 90 tablet, Rfl: 3    Nyamyc 100,000 unit/gram powder, Apply 1 Application topically 3 times a day. To rash, Disp: , Rfl:     pravastatin (Pravachol) 40 mg tablet, Take 1 tablet (40 mg) by mouth once daily., Disp: , Rfl:     rosuvastatin (Crestor) 40 mg tablet, Take 1 tablet (40 mg) by mouth once daily at bedtime., Disp: 30 tablet, Rfl: 0    spironolactone (Aldactone) 25 mg tablet, Take 1 tablet (25 mg) by mouth once daily., Disp: 30 tablet, Rfl: 0    Trelegy Ellipta 100-62.5-25 mcg blister with device, , Disp: , Rfl:     lisinopriL-hydrochlorothiazide 20-25 mg tablet, Take 1 tablet by mouth once daily., Disp: 90 tablet, Rfl: 0     Allergies:  Allergies   Allergen Reactions    Ace Inhibitors Cough    Amlodipine Besylate Respiratory depression    Losartan Unknown        ROS:  Review of systems normal unless stated otherwise in the HPI and/or PMH.    Physical Exam:  Height 1.575 m (5' 2\"), weight 110 kg (243 lb). Body mass index is 44.45 kg/m².     GENERAL APPEARANCE: Well developed and well nourished.  Alert and oriented in no acute distress.  Normal vocal quality.      HEAD/FACE: No erythema or edema or facial tenderness.  Normal facial nerve function bilaterally.    EAR:       EXTERNAL: Normal pinnas and external auditory canals without lesion or obstructing wax.  Wax impaction on the right was removed by the medical assistant.  Normal exam afterwards.       MIDDLE EAR: Tympanic membranes intact and mobile with normal landmarks.  Middle ear space appears well aerated.       TUBE STATUS: N/A       MASTOID CAVITY: N/A       HEARING: Gross hearing assessment is within normal limits.      NOSE:       VISUALIZED USING: Anterior rhinoscopy with headlight and nasal speculum.       DORSUM: Midline, nontraumatic appearance.       MUCOSA: Normal-appearing.       SECRETIONS: " Normal.       SEPTUM: Midline and nonobstructing.       INFERIOR TURBINATES: Normal.       MIDDLE TURBINATES/MEATUS: N/A       BLEEDING: N/A         ORAL CAVITY/PHARYNX:       TEETH: Adequate dentition.       TONGUE: No mass or lesion.  Normal mobility.       FLOOR OF MOUTH: No mass or lesion.       PALATE: Normal hard palate, soft palate, and uvula.       OROPHARYNX: Normal without mass or lesion.  Tonsils absent       BUCCAL MUCOSA/GBS: Normal without mass or lesion.       LIPS: Normal.    LARYNX/HYPOPHARYNX/NASOPHARYNX: N/A    NECK: No palpable masses or abnormal adenopathy.  Trachea is midline.    THYROID: No thyromegaly or palpable nodule.    SALIVARY GLANDS: Normal bilateral parotid and submandibular glands by inspection and palpation.    TMJ's: Normal.    NEURO: Cranial nerve exam grossly normal bilaterally.       Assessment/Plan   Ondina was seen today for new patient visit.  Diagnoses and all orders for this visit:  Bilateral sensorineural hearing loss (Primary)  Bilateral tinnitus  Impacted cerumen of right ear  Obstructive sleep apnea     Right ear was cleaned of wax.  This should help with plugged sensation.  Reassured about her bilateral high-frequency sensorineural hearing loss.  Keep close eye in this, avoid loud noise exposures and recheck audiogram in a year.  Follow up in about 1 year (around 9/30/2025) for audiogram, Recheck.     David Bernard MD

## 2024-09-30 NOTE — PROGRESS NOTES
AUDIOLOGY  AUDIOMETRIC EVALUATION    Name:  Ondina Simmons  :  1950  Age:  73 y.o.  Date of Evaluation:  2024    Reason for visit: Ms. Simmons is seen in the clinic today at the request of David Bernard MD in otolaryngology for an audiologic evaluation. Patient complains of aural fullness and difficulty hearing. No prior audiologic evaluation is available for comparison.     SCREENINGS  Steadi Fall Risk  One or more falls in the last year? No  Feels unsteady when walking? No  Worried about Falling? No    Domestic Violence Screening  Are you currently or have you recently been threatened or abused physically, emotionally, or sexually by anyone? No  Do you feel UNSAFE going back to the place you are living? No    Pain Assessment  Pain Assessment: 0-10  0-10 (Numeric) Pain Score: 5 - Moderate pain  Pain Type: Chronic pain    EVALUATION  See scanned audiogram: “Media” > “Audiology Report” > Document ID [id number].      RESULTS  Otoscopic Evaluation  Right Ear: non-occluding cerumen without visualization of the tympanic membrane  Left Ear: minimal non-occluding cerumen with visualization of the tympanic membrane     Immittance Measures  Tympanometry:  Right Ear: Type A, normal tympanic membrane mobility with normal middle ear pressure  Left Ear: Type A, normal tympanic membrane mobility with normal middle ear pressure    Acoustic Reflexes:  Ipsilateral Right Ear: present and normal from 500 Hz to 4000 Hz  Ipsilateral Left Ear: present and normal from 500 Hz to 4000 Hz  Contralateral Right Ear: did not evaluate  Contralateral Left Ear: did not evaluate    Distortion Product Otoacoustic Emissions (DPOAEs)  Right Ear: present at 1500 Hz and from 3000 Hz to 4000 Hz, absent at 1000 Hz and at 2000 Hz and from 5000 Hz to 8000 Hz  Left Ear: absent from 1000 Hz to 8000 Hz    Audiometry  Test Technique and Reliability:   Standard audiometry via supra-aural headphones. Pulsed tone stimulus.  Reliability is good.    Pure tone air and bone conduction audiometry:  Right Ear: normal hearing sensitivity through 4000 Hz with a moderate to moderately-severe sensorineural hearing loss in the higher frequencies   Left Ear: normal hearing sensitivity through 3000 Hz with a moderate to moderately-severe sensorineural hearing loss in the higher frequencies     Speech Audiometry:  Speech Reception Threshold (SRT) Right Ear: 15 dB HL  Speech Reception Threshold (SRT) Left Ear: 20 dB HL  Word Recognition Score (WRS) Right Ear: Excellent, 100% at 55 dB HL  Word Recognition Score (WRS) Left Ear: Excellent, 100% at 60 dB HL     IMPRESSIONS  Audiometric evaluation revealed high frequency sensorineural hearing loss bilaterally. Tympanometry indicates normal tympanic membrane mobility with normal middle ear pressure bilaterally. No prior audiologic evaluation is available for comparison. The presence of acoustic reflexes within normal intensity limits is consistent with normal middle ear and brainstem function, and suggests that auditory sensitivity is not significantly impaired. Present Distortion Product Otoacoustic Emissions (DPOAEs) suggest normal/near normal cochlear outer hair cell function and are consistent with no greater than a mild hearing loss at those frequencies. Absent DPOAEs are consistent with abnormal cochlear outer hair cell function at those frequencies.    RECOMMENDATIONS  - Follow up with otolaryngology today as scheduled.  - Annual audiologic evaluation, sooner if an acute change is noted.  - Follow-up with medical care team as planned.    PATIENT EDUCATION  Discussed results, impressions and recommendations with the patient. Questions were addressed and the patient was encouraged to contact our office should concerns arise.    Time for this encounter: 6946-6667    Tiarra Orantes, CCC-A  Licensed Audiologist

## 2024-11-01 ENCOUNTER — HOSPITAL ENCOUNTER (OUTPATIENT)
Dept: RADIOLOGY | Facility: CLINIC | Age: 74
End: 2024-11-01
Payer: MEDICARE

## 2024-11-04 ENCOUNTER — TELEPHONE (OUTPATIENT)
Dept: OBSTETRICS AND GYNECOLOGY | Facility: CLINIC | Age: 74
End: 2024-11-04
Payer: MEDICARE

## 2024-11-04 DIAGNOSIS — Z12.31 SCREENING MAMMOGRAM FOR BREAST CANCER: Primary | ICD-10-CM

## 2024-11-04 NOTE — TELEPHONE ENCOUNTER
Yearly was 9/28/23, pt wants to know if you could send in mammo for her, or if she doesn't need them anymore since she is 73

## 2024-11-11 ENCOUNTER — LAB (OUTPATIENT)
Dept: LAB | Facility: LAB | Age: 74
End: 2024-11-11
Payer: MEDICARE

## 2024-11-11 DIAGNOSIS — E03.9 HYPOTHYROIDISM, UNSPECIFIED: ICD-10-CM

## 2024-11-11 DIAGNOSIS — I10 ESSENTIAL (PRIMARY) HYPERTENSION: ICD-10-CM

## 2024-11-11 DIAGNOSIS — E78.5 HYPERLIPIDEMIA, UNSPECIFIED: ICD-10-CM

## 2024-11-11 DIAGNOSIS — R73.02 IMPAIRED GLUCOSE TOLERANCE (ORAL): Primary | ICD-10-CM

## 2024-11-11 LAB
ALBUMIN SERPL BCP-MCNC: 3.8 G/DL (ref 3.4–5)
ALP SERPL-CCNC: 98 U/L (ref 33–136)
ALT SERPL W P-5'-P-CCNC: 17 U/L (ref 7–45)
ANION GAP SERPL CALC-SCNC: 14 MMOL/L (ref 10–20)
APPEARANCE UR: CLEAR
AST SERPL W P-5'-P-CCNC: 18 U/L (ref 9–39)
BACTERIA #/AREA URNS AUTO: ABNORMAL /HPF
BILIRUB SERPL-MCNC: 0.4 MG/DL (ref 0–1.2)
BILIRUB UR STRIP.AUTO-MCNC: NEGATIVE MG/DL
BUN SERPL-MCNC: 21 MG/DL (ref 6–23)
CALCIUM SERPL-MCNC: 9.7 MG/DL (ref 8.6–10.6)
CHLORIDE SERPL-SCNC: 107 MMOL/L (ref 98–107)
CHOLEST SERPL-MCNC: 241 MG/DL (ref 0–199)
CHOLESTEROL/HDL RATIO: 4.5
CO2 SERPL-SCNC: 28 MMOL/L (ref 21–32)
COLOR UR: YELLOW
CREAT SERPL-MCNC: 1.09 MG/DL (ref 0.5–1.05)
EGFRCR SERPLBLD CKD-EPI 2021: 54 ML/MIN/1.73M*2
ERYTHROCYTE [DISTWIDTH] IN BLOOD BY AUTOMATED COUNT: 13.3 % (ref 11.5–14.5)
EST. AVERAGE GLUCOSE BLD GHB EST-MCNC: 126 MG/DL
GLUCOSE SERPL-MCNC: 106 MG/DL (ref 74–99)
GLUCOSE UR STRIP.AUTO-MCNC: NORMAL MG/DL
HBA1C MFR BLD: 6 %
HCT VFR BLD AUTO: 40.6 % (ref 36–46)
HDLC SERPL-MCNC: 53.3 MG/DL
HGB BLD-MCNC: 13 G/DL (ref 12–16)
HYALINE CASTS #/AREA URNS AUTO: ABNORMAL /LPF
KETONES UR STRIP.AUTO-MCNC: NEGATIVE MG/DL
LDLC SERPL CALC-MCNC: 166 MG/DL
LEUKOCYTE ESTERASE UR QL STRIP.AUTO: ABNORMAL
MCH RBC QN AUTO: 29.9 PG (ref 26–34)
MCHC RBC AUTO-ENTMCNC: 32 G/DL (ref 32–36)
MCV RBC AUTO: 93 FL (ref 80–100)
MUCOUS THREADS #/AREA URNS AUTO: ABNORMAL /LPF
NITRITE UR QL STRIP.AUTO: NEGATIVE
NON HDL CHOLESTEROL: 188 MG/DL (ref 0–149)
NRBC BLD-RTO: 0 /100 WBCS (ref 0–0)
PH UR STRIP.AUTO: 5 [PH]
PLATELET # BLD AUTO: 284 X10*3/UL (ref 150–450)
POTASSIUM SERPL-SCNC: 4.3 MMOL/L (ref 3.5–5.3)
PROT SERPL-MCNC: 6.8 G/DL (ref 6.4–8.2)
PROT UR STRIP.AUTO-MCNC: NEGATIVE MG/DL
RBC # BLD AUTO: 4.35 X10*6/UL (ref 4–5.2)
RBC # UR STRIP.AUTO: ABNORMAL /UL
RBC #/AREA URNS AUTO: ABNORMAL /HPF
SODIUM SERPL-SCNC: 145 MMOL/L (ref 136–145)
SP GR UR STRIP.AUTO: 1.03
SQUAMOUS #/AREA URNS AUTO: ABNORMAL /HPF
TRIGL SERPL-MCNC: 111 MG/DL (ref 0–149)
TSH SERPL-ACNC: 3.93 MIU/L (ref 0.44–3.98)
UROBILINOGEN UR STRIP.AUTO-MCNC: NORMAL MG/DL
VLDL: 22 MG/DL (ref 0–40)
WBC # BLD AUTO: 5.6 X10*3/UL (ref 4.4–11.3)
WBC #/AREA URNS AUTO: ABNORMAL /HPF

## 2024-11-11 PROCEDURE — 81001 URINALYSIS AUTO W/SCOPE: CPT

## 2024-11-11 PROCEDURE — 84443 ASSAY THYROID STIM HORMONE: CPT

## 2024-11-11 PROCEDURE — 85027 COMPLETE CBC AUTOMATED: CPT

## 2024-11-11 PROCEDURE — 80053 COMPREHEN METABOLIC PANEL: CPT

## 2024-11-11 PROCEDURE — 36415 COLL VENOUS BLD VENIPUNCTURE: CPT

## 2024-11-11 PROCEDURE — 83036 HEMOGLOBIN GLYCOSYLATED A1C: CPT

## 2024-11-11 PROCEDURE — 80061 LIPID PANEL: CPT

## 2024-11-18 ENCOUNTER — APPOINTMENT (OUTPATIENT)
Dept: OPHTHALMOLOGY | Facility: CLINIC | Age: 74
End: 2024-11-18
Payer: MEDICARE

## 2024-11-25 NOTE — PROGRESS NOTES
8/13/2024 CC: 73 y.o. presents for FU, CAC, Dr Huerta    Past ocular history: OS trauma age of 7, PK 25 yrs ago, 2ry intraocular lens (IOL) 15 yrs, CAC  Family history: no family history of glaucoma   Past medical history: HTN, Asthma (steroid inhaler for 3 yrs)  Social history: denies tobacco     Eye medications:   Both eyes: ATs    Allergy:  As per chart    Testing:    HVF 24-2 5/1/2024: OD full, MD -0.7, OS gen depression, MD -5.5 dB    OCT 3/6/2023: OD bdl S, avg 86, OS full, avg 85 um    OCT 8/13/2024: OD bdl SN, avg 81. 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    OS trauma age of 7  - S/p PK ~ 25 yrs ago  - S/p 2ry IOL ~ 10 yrs ago    NS cataract OD   - NVS, monitor    Pseudophakia OS    ELA    Blepharitis  -WC/Ats bid    Plan:   I explained my findings. GS OS, CTM    Eye medications:   Both eyes: Continue PFATs, start WC bid    Return in 6 mo, Dr Huerta, HVF/OCT

## 2024-11-26 ENCOUNTER — APPOINTMENT (OUTPATIENT)
Dept: OPHTHALMOLOGY | Facility: CLINIC | Age: 74
End: 2024-11-26
Payer: MEDICARE

## 2024-11-26 DIAGNOSIS — H40.2221 CHRONIC ANGLE-CLOSURE GLAUCOMA, MILD STAGE, LEFT: Primary | ICD-10-CM

## 2024-11-26 PROCEDURE — 99212 OFFICE O/P EST SF 10 MIN: CPT | Performed by: OPHTHALMOLOGY

## 2024-11-26 ASSESSMENT — ENCOUNTER SYMPTOMS
MUSCULOSKELETAL NEGATIVE: 0
GASTROINTESTINAL NEGATIVE: 0
HEMATOLOGIC/LYMPHATIC NEGATIVE: 0
PSYCHIATRIC NEGATIVE: 0
NEUROLOGICAL NEGATIVE: 0
RESPIRATORY NEGATIVE: 0
CARDIOVASCULAR NEGATIVE: 0
CONSTITUTIONAL NEGATIVE: 0
EYES NEGATIVE: 1
ENDOCRINE NEGATIVE: 0
ALLERGIC/IMMUNOLOGIC NEGATIVE: 0

## 2024-11-26 ASSESSMENT — CONF VISUAL FIELD
OD_INFERIOR_TEMPORAL_RESTRICTION: 0
OD_NORMAL: 1
OS_SUPERIOR_TEMPORAL_RESTRICTION: 0
OS_INFERIOR_TEMPORAL_RESTRICTION: 0
OD_SUPERIOR_NASAL_RESTRICTION: 0
OS_NORMAL: 1
OD_INFERIOR_NASAL_RESTRICTION: 0
OS_INFERIOR_NASAL_RESTRICTION: 0
OS_SUPERIOR_NASAL_RESTRICTION: 0
OD_SUPERIOR_TEMPORAL_RESTRICTION: 0

## 2024-11-26 ASSESSMENT — EXTERNAL EXAM - RIGHT EYE: OD_EXAM: NORMAL

## 2024-11-26 ASSESSMENT — VISUAL ACUITY
CORRECTION_TYPE: GLASSES
OS_CC+: -2
OS_CC: 20/30
OD_CC: 20/20
METHOD: SNELLEN - LINEAR

## 2024-11-26 ASSESSMENT — SLIT LAMP EXAM - LIDS
COMMENTS: GOOD POSITION
COMMENTS: GOOD POSITION

## 2024-11-26 ASSESSMENT — TONOMETRY
IOP_METHOD: GOLDMANN APPLANATION
OS_IOP_MMHG: 14
OD_IOP_MMHG: 12

## 2024-11-26 ASSESSMENT — EXTERNAL EXAM - LEFT EYE: OS_EXAM: NORMAL

## 2024-11-26 ASSESSMENT — PACHYMETRY
OD_CT(UM): 556
OS_CT(UM): 623

## 2024-11-26 ASSESSMENT — CUP TO DISC RATIO
OD_RATIO: .3
OS_RATIO: .45

## 2024-12-04 ENCOUNTER — APPOINTMENT (OUTPATIENT)
Dept: UROLOGY | Facility: CLINIC | Age: 74
End: 2024-12-04
Payer: MEDICARE

## 2024-12-04 VITALS — TEMPERATURE: 97 F | HEIGHT: 63 IN | BODY MASS INDEX: 43.05 KG/M2

## 2024-12-04 DIAGNOSIS — R31.29 OTHER MICROSCOPIC HEMATURIA: Primary | ICD-10-CM

## 2024-12-04 DIAGNOSIS — Z79.2 NEED FOR PROPHYLACTIC ANTIBIOTIC: ICD-10-CM

## 2024-12-04 LAB
POC APPEARANCE, URINE: CLEAR
POC BILIRUBIN, URINE: NEGATIVE
POC BLOOD, URINE: NEGATIVE
POC COLOR, URINE: YELLOW
POC GLUCOSE, URINE: NEGATIVE MG/DL
POC KETONES, URINE: NEGATIVE MG/DL
POC LEUKOCYTES, URINE: ABNORMAL
POC NITRITE,URINE: NEGATIVE
POC PH, URINE: 5.5 PH
POC PROTEIN, URINE: NEGATIVE MG/DL
POC SPECIFIC GRAVITY, URINE: 1.01
POC UROBILINOGEN, URINE: 0.2 EU/DL

## 2024-12-04 PROCEDURE — 1036F TOBACCO NON-USER: CPT | Performed by: UROLOGY

## 2024-12-04 PROCEDURE — 1159F MED LIST DOCD IN RCRD: CPT | Performed by: UROLOGY

## 2024-12-04 PROCEDURE — 1126F AMNT PAIN NOTED NONE PRSNT: CPT | Performed by: UROLOGY

## 2024-12-04 PROCEDURE — G2211 COMPLEX E/M VISIT ADD ON: HCPCS | Performed by: UROLOGY

## 2024-12-04 PROCEDURE — 99204 OFFICE O/P NEW MOD 45 MIN: CPT | Performed by: UROLOGY

## 2024-12-04 PROCEDURE — 81003 URINALYSIS AUTO W/O SCOPE: CPT | Performed by: UROLOGY

## 2024-12-04 RX ORDER — CEPHALEXIN 500 MG/1
500 CAPSULE ORAL ONCE
Qty: 1 CAPSULE | Refills: 0 | Status: SHIPPED | OUTPATIENT
Start: 2024-12-04 | End: 2024-12-04

## 2024-12-04 ASSESSMENT — PAIN SCALES - GENERAL: PAINLEVEL_OUTOF10: 0-NO PAIN

## 2024-12-04 NOTE — PATIENT INSTRUCTIONS
Radiology [phone]  Take antibiotic 1 hour prior to cystoscopy.  We will need a urine sample during that appointment, so please arrive with a full enough bladder to leave a sample.  There are no restrictions in medications, eating/drinking, and driving.

## 2024-12-04 NOTE — PROGRESS NOTES
Subjective   Ondina Simmons is a 73 y.o. female presenting as a new patient today due to microscopic hematuria.   Microscopic UA from 11/11/24 showed 1-2 RBC's, however, she had tests that showed 0-5 and 4 RBC's in the past. Patient has history of smoking. She has family history of nephrolithiasis. She has no bothersome urinary symptoms. Denies any recent gross hematuria, fevers, chills, urinary retention, intractable flank or abdominal pain, nausea or vomiting.        Past Medical History:   Diagnosis Date    Acute on chronic diastolic heart failure 09/13/2023    Angina pectoris 09/13/2023    Aortic dilatation (CMS-[facility]) 09/13/2023    Chest pain 09/13/2023    COPD (chronic obstructive pulmonary disease) (Multi)     Diastolic dysfunction 09/13/2023    Dyslipidemia 09/13/2023    Dyspnea on exertion 09/13/2023    Encounter for gynecological examination (general) (routine) without abnormal findings     Encounter for routine pelvic examination    Encounter for screening mammogram for malignant neoplasm of breast     Visit for screening mammogram    Essential hypertension 09/13/2023    High blood pressure     Hyperlipidemia 09/13/2023    Hyperlipidemia, unspecified 04/12/2018    Hyperlipidemia    Hyperlipidemia, unspecified     Dyslipidemia    Left arm pain     Left hand pain     Lymphedema     Morbid (severe) obesity due to excess calories (Multi)     Morbid obesity    Other conditions influencing health status     Osteoarthritis    Personal history of other diseases of the circulatory system     History of essential hypertension    Personal history of other diseases of the digestive system     History of esophageal reflux    Sleep apnea      Past Surgical History:   Procedure Laterality Date    CARDIAC PACEMAKER REMOVAL      COLONOSCOPY  06/07/2013    Complete Colonoscopy    OTHER SURGICAL HISTORY  02/10/2014    Cornea Transplant     Family History   Problem Relation Name Age of Onset    Diabetes Mother      Heart  disease Mother      Cancer Father      Cancer Sister      Breast cancer Mother's Sister      Breast cancer Other family history     Diabetes Other family history     Heart disease Other family history     Other (htn) Other family history      Current Outpatient Medications   Medication Sig Dispense Refill    acetaminophen (Tylenol Extra Strength) 500 mg tablet Take 1 tablet (500 mg) by mouth every 6 hours.      Advair Diskus 250-50 mcg/dose diskus inhaler Inhale 1 puff every 12 hours.  RINSE AFTER EACH USE      albuterol 90 mcg/actuation inhaler Inhale 2 puffs every 4 hours if needed for shortness of breath.      amLODIPine (Norvasc) 10 mg tablet Take 1 tablet (10 mg) by mouth once daily.      amLODIPine (Norvasc) 5 mg tablet Take 1 tablet (5 mg) by mouth once daily.      aspirin 81 mg EC tablet Aspirin EC 81 MG TBEC  Refills: 0 MD Te Napoles Start: 14-Nov-2022      chlorhexidine (Peridex) 0.12 % solution Take by mouth. RINSE MOUTH WITH 1/2 OUNCE TWICE DAILY AFTER BREAKFAST AND BEFORE BEDTIME FOLLOWING BRUSHING AND FLOSSING      fluticasone (Flonase) 50 mcg/actuation nasal spray       furosemide (Lasix) 20 mg tablet Take 1 tablet (20 mg) by mouth once daily.      furosemide (Lasix) 40 mg tablet Take 1 tablet (40 mg) by mouth every other day.      hydrALAZINE (Apresoline) 25 mg tablet Take 1 tablet (25 mg) by mouth 2 times a day. 60 tablet 11    lisinopril 30 mg tablet once every 24 hours.      lisinopriL-hydrochlorothiazide 20-25 mg tablet Take 1 tablet by mouth once daily. 90 tablet 0    losartan (Cozaar) 100 mg tablet Take 1 tablet (100 mg) by mouth once daily.      melatonin 10 mg capsule Take 1 capsule (10 mg) by mouth once daily at bedtime.      multivitamin with minerals iron-free (Centrum Silver) Take 1 tablet by mouth once daily.      naproxen (Naprosyn) 500 mg tablet TAKE 1 TABLET BY MOUTH WITH FOOD TWICE A DAY      nitroglycerin (Nitrostat) 0.4 mg SL tablet Place 1 tablet (0.4 mg) under the  tongue every 5 minutes if needed for chest pain. as directed 90 tablet 3    Nyamyc 100,000 unit/gram powder Apply 1 Application topically 3 times a day. To rash      pravastatin (Pravachol) 40 mg tablet Take 1 tablet (40 mg) by mouth once daily.      rosuvastatin (Crestor) 40 mg tablet Take 1 tablet (40 mg) by mouth once daily at bedtime. 30 tablet 0    spironolactone (Aldactone) 25 mg tablet Take 1 tablet (25 mg) by mouth once daily. 30 tablet 0    Trelegy Ellipta 100-62.5-25 mcg blister with device        No current facility-administered medications for this visit.     Allergies   Allergen Reactions    Ace Inhibitors Cough    Amlodipine Besylate Respiratory depression    Losartan Unknown     Social History     Socioeconomic History    Marital status:      Spouse name: Not on file    Number of children: Not on file    Years of education: Not on file    Highest education level: Not on file   Occupational History    Not on file   Tobacco Use    Smoking status: Never     Passive exposure: Never    Smokeless tobacco: Never   Vaping Use    Vaping status: Never Used   Substance and Sexual Activity    Alcohol use: Yes     Alcohol/week: 1.0 standard drink of alcohol     Types: 1 Glasses of wine per week    Drug use: Never    Sexual activity: Defer   Other Topics Concern    Not on file   Social History Narrative    Not on file     Social Drivers of Health     Financial Resource Strain: Not on file   Food Insecurity: Not on file   Transportation Needs: Not on file   Physical Activity: Not on file   Stress: Not on file   Social Connections: Not on file   Intimate Partner Violence: Not on file   Housing Stability: Not on file       Review of Systems  Pertinent items are noted in HPI.    Objective       Lab Review  Lab Results   Component Value Date    WBC 5.6 11/11/2024    RBC 4.35 11/11/2024    HGB 13.0 11/11/2024    HCT 40.6 11/11/2024     11/11/2024      Lab Results   Component Value Date    BUN 21 11/11/2024     CREATININE 1.09 (H) 11/11/2024        Urine analysis shows +leukocytes       Assessment/Plan   Diagnoses and all orders for this visit:  Other microscopic hematuria  -     POCT UA Automated manually resulted      Microscopic UA     Patient's microscopic hematuria is intermittent.     Microscopic UA from 11/11/24 showed 1-2 RBC's, which is considered negative; however, she had tests that showed 0-5 and 4 RBC's in the past.      Due to patient's previous history of smoking we will proceed with workup.     eGFR was down to 40 (6/22/24), therefore, we will avoid IV contrast due to risk of nephropathy.       We will plan for diagnostic hematuria workup which includes cystoscopy and renal US. The risks of cystoscopy were discussed with the patient in great detail, including risk of hematuria, UTI and discomfort. Antibiotic will be prescribed to be taken 1 hour prior to the procedure. Patient agrees to proceed. Will schedule in a timely manner.        All questions were answered to the patient's satisfaction. Patient agrees with the plan and wishes to proceed. Follow-up will be scheduled appropriately.       Scribed for Dr. Orellana by Myah Andrews. I , Dr Orellana, have personally reviewed and agreed with the information entered by the Virtual Scribe.

## 2024-12-06 ENCOUNTER — APPOINTMENT (OUTPATIENT)
Dept: RADIOLOGY | Facility: HOSPITAL | Age: 74
End: 2024-12-06
Payer: MEDICARE

## 2024-12-23 ENCOUNTER — HOSPITAL ENCOUNTER (OUTPATIENT)
Dept: RADIOLOGY | Facility: HOSPITAL | Age: 74
Discharge: HOME | End: 2024-12-23
Payer: MEDICARE

## 2024-12-23 DIAGNOSIS — R31.29 OTHER MICROSCOPIC HEMATURIA: ICD-10-CM

## 2024-12-23 PROCEDURE — 76770 US EXAM ABDO BACK WALL COMP: CPT | Performed by: RADIOLOGY

## 2024-12-23 PROCEDURE — 76770 US EXAM ABDO BACK WALL COMP: CPT

## 2025-01-08 ENCOUNTER — HOSPITAL ENCOUNTER (OUTPATIENT)
Dept: RADIOLOGY | Facility: HOSPITAL | Age: 75
Discharge: HOME | End: 2025-01-08
Payer: MEDICARE

## 2025-01-08 ENCOUNTER — APPOINTMENT (OUTPATIENT)
Dept: UROLOGY | Facility: CLINIC | Age: 75
End: 2025-01-08
Payer: MEDICARE

## 2025-01-08 VITALS
TEMPERATURE: 96.5 F | WEIGHT: 240 LBS | BODY MASS INDEX: 42.52 KG/M2 | HEART RATE: 71 BPM | SYSTOLIC BLOOD PRESSURE: 154 MMHG | DIASTOLIC BLOOD PRESSURE: 86 MMHG | HEIGHT: 63 IN

## 2025-01-08 DIAGNOSIS — Z12.31 SCREENING MAMMOGRAM FOR BREAST CANCER: ICD-10-CM

## 2025-01-08 DIAGNOSIS — R31.29 OTHER MICROSCOPIC HEMATURIA: ICD-10-CM

## 2025-01-08 LAB
POC APPEARANCE, URINE: CLEAR
POC BILIRUBIN, URINE: NEGATIVE
POC BLOOD, URINE: ABNORMAL
POC COLOR, URINE: YELLOW
POC GLUCOSE, URINE: NEGATIVE MG/DL
POC KETONES, URINE: NEGATIVE MG/DL
POC LEUKOCYTES, URINE: ABNORMAL
POC NITRITE,URINE: NEGATIVE
POC PH, URINE: 5.5 PH
POC PROTEIN, URINE: NEGATIVE MG/DL
POC SPECIFIC GRAVITY, URINE: 1.01
POC UROBILINOGEN, URINE: 0.2 EU/DL

## 2025-01-08 PROCEDURE — 77063 BREAST TOMOSYNTHESIS BI: CPT | Performed by: RADIOLOGY

## 2025-01-08 PROCEDURE — 81003 URINALYSIS AUTO W/O SCOPE: CPT | Performed by: UROLOGY

## 2025-01-08 PROCEDURE — 77067 SCR MAMMO BI INCL CAD: CPT | Performed by: RADIOLOGY

## 2025-01-08 PROCEDURE — 52000 CYSTOURETHROSCOPY: CPT | Performed by: UROLOGY

## 2025-01-08 PROCEDURE — 77067 SCR MAMMO BI INCL CAD: CPT

## 2025-01-08 RX ORDER — CARVEDILOL 12.5 MG/1
12.5 TABLET ORAL
COMMUNITY

## 2025-01-08 RX ORDER — OMEPRAZOLE 20 MG/1
CAPSULE, DELAYED RELEASE ORAL
COMMUNITY
Start: 2024-12-11

## 2025-01-08 RX ORDER — HYDROCHLOROTHIAZIDE 25 MG/1
25 TABLET ORAL
COMMUNITY

## 2025-01-08 ASSESSMENT — PAIN SCALES - GENERAL: PAINLEVEL_OUTOF10: 0-NO PAIN

## 2025-01-08 NOTE — PROGRESS NOTES
Subjective   Ondina Simmons is a 74 y.o. female with history of microscopic hematuria, presenting today for cystoscopy.      LAST VISIT (12/4/2024):  Ondina Simmons is a 73 y.o. female presenting as a new patient today due to microscopic hematuria. Microscopic UA from 11/11/24 showed 1-2 RBC's, however, she had tests that showed 0-5 and 4 RBC's in the past. Patient has history of smoking. She has family history of nephrolithiasis. She has no bothersome urinary symptoms. Denies any recent gross hematuria, fevers, chills, urinary retention, intractable flank or abdominal pain, nausea or vomiting.    Past Medical History:   Diagnosis Date    Acute on chronic diastolic heart failure 09/13/2023    Angina pectoris 09/13/2023    Aortic dilatation (CMS-HCC) 09/13/2023    Chest pain 09/13/2023    COPD (chronic obstructive pulmonary disease) (Multi)     Diastolic dysfunction 09/13/2023    Dyslipidemia 09/13/2023    Dyspnea on exertion 09/13/2023    Encounter for gynecological examination (general) (routine) without abnormal findings     Encounter for routine pelvic examination    Encounter for screening mammogram for malignant neoplasm of breast     Visit for screening mammogram    Essential hypertension 09/13/2023    High blood pressure     Hyperlipidemia 09/13/2023    Hyperlipidemia, unspecified 04/12/2018    Hyperlipidemia    Hyperlipidemia, unspecified     Dyslipidemia    Left arm pain     Left hand pain     Lymphedema     Morbid (severe) obesity due to excess calories (Multi)     Morbid obesity    Other conditions influencing health status     Osteoarthritis    Personal history of other diseases of the circulatory system     History of essential hypertension    Personal history of other diseases of the digestive system     History of esophageal reflux    Sleep apnea      Past Surgical History:   Procedure Laterality Date    CARDIAC PACEMAKER REMOVAL      COLONOSCOPY  06/07/2013    Complete Colonoscopy    OTHER  SURGICAL HISTORY  02/10/2014    Cornea Transplant     Family History   Problem Relation Name Age of Onset    Diabetes Mother      Heart disease Mother      Cancer Father      Cancer Sister      Breast cancer Mother's Sister      Breast cancer Other family history     Diabetes Other family history     Heart disease Other family history     Other (htn) Other family history      Current Outpatient Medications   Medication Sig Dispense Refill    acetaminophen (Tylenol Extra Strength) 500 mg tablet Take 1 tablet (500 mg) by mouth every 6 hours.      Advair Diskus 250-50 mcg/dose diskus inhaler Inhale 1 puff every 12 hours.  RINSE AFTER EACH USE      albuterol 90 mcg/actuation inhaler Inhale 2 puffs every 4 hours if needed for shortness of breath.      amLODIPine (Norvasc) 10 mg tablet Take 1 tablet (10 mg) by mouth once daily.      amLODIPine (Norvasc) 5 mg tablet Take 1 tablet (5 mg) by mouth once daily.      aspirin 81 mg EC tablet Aspirin EC 81 MG TBEC  Refills: 0 MD Te Napoles Start: 14-Nov-2022      chlorhexidine (Peridex) 0.12 % solution Take by mouth. RINSE MOUTH WITH 1/2 OUNCE TWICE DAILY AFTER BREAKFAST AND BEFORE BEDTIME FOLLOWING BRUSHING AND FLOSSING      fluticasone (Flonase) 50 mcg/actuation nasal spray       furosemide (Lasix) 20 mg tablet Take 1 tablet (20 mg) by mouth once daily.      furosemide (Lasix) 40 mg tablet Take 1 tablet (40 mg) by mouth every other day.      hydrALAZINE (Apresoline) 25 mg tablet Take 1 tablet (25 mg) by mouth 2 times a day. 60 tablet 11    lisinopril 30 mg tablet once every 24 hours.      lisinopriL-hydrochlorothiazide 20-25 mg tablet Take 1 tablet by mouth once daily. 90 tablet 0    losartan (Cozaar) 100 mg tablet Take 1 tablet (100 mg) by mouth once daily.      melatonin 10 mg capsule Take 1 capsule (10 mg) by mouth once daily at bedtime.      multivitamin with minerals iron-free (Centrum Silver) Take 1 tablet by mouth once daily.      naproxen (Naprosyn) 500  mg tablet TAKE 1 TABLET BY MOUTH WITH FOOD TWICE A DAY      nitroglycerin (Nitrostat) 0.4 mg SL tablet Place 1 tablet (0.4 mg) under the tongue every 5 minutes if needed for chest pain. as directed 90 tablet 3    Nyamyc 100,000 unit/gram powder Apply 1 Application topically 3 times a day. To rash      pravastatin (Pravachol) 40 mg tablet Take 1 tablet (40 mg) by mouth once daily.      rosuvastatin (Crestor) 40 mg tablet Take 1 tablet (40 mg) by mouth once daily at bedtime. 30 tablet 0    spironolactone (Aldactone) 25 mg tablet Take 1 tablet (25 mg) by mouth once daily. 30 tablet 0    Trelegy Ellipta 100-62.5-25 mcg blister with device        No current facility-administered medications for this visit.     Allergies   Allergen Reactions    Ace Inhibitors Cough    Amlodipine Besylate Respiratory depression    Losartan Unknown     Social History     Socioeconomic History    Marital status:      Spouse name: Not on file    Number of children: Not on file    Years of education: Not on file    Highest education level: Not on file   Occupational History    Not on file   Tobacco Use    Smoking status: Never     Passive exposure: Never    Smokeless tobacco: Never   Vaping Use    Vaping status: Never Used   Substance and Sexual Activity    Alcohol use: Yes     Alcohol/week: 1.0 standard drink of alcohol     Types: 1 Glasses of wine per week    Drug use: Never    Sexual activity: Defer   Other Topics Concern    Not on file   Social History Narrative    Not on file     Social Drivers of Health     Financial Resource Strain: Not on file   Food Insecurity: Not on file   Transportation Needs: Not on file   Physical Activity: Not on file   Stress: Not on file   Social Connections: Not on file   Intimate Partner Violence: Not on file   Housing Stability: Not on file       Review of Systems  Pertinent items are noted in HPI.    Objective   Cystoscopy performed:   Ondina Simmons identified using two (2) forms of  identification.  Procedure: diagnostic cystourethroscopy  Indications for procedure: Microscopic hematuria  Risks, benefits, and alternatives were discussed in detail.   Patient appears to understand and agrees to proceed.   Patient has signed the procedure consent form.     Cystoscopy findings:  Urethra: normal course and caliber, no evidence of stricture or lesion.  Bladder: normal capacity, no trabeculations, no diverticulum, no stone, tumors or other lesions.  Post-cystoscopy: Patient tolerated procedure without complications.        Lab Review  Lab Results   Component Value Date    WBC 5.6 11/11/2024    RBC 4.35 11/11/2024    HGB 13.0 11/11/2024    HCT 40.6 11/11/2024     11/11/2024      Lab Results   Component Value Date    BUN 21 11/11/2024    CREATININE 1.09 (H) 11/11/2024        Urine analysis shows +leukocytes, +blood     Assessment/Plan   There are no diagnoses linked to this encounter.    Microscopic Hematuria     I reviewed renal US from 12/23/2024 which showed 2.0 x 2.1 x 2.6 cm simple left renal cyst with unremarkable appearance of right kidney.    Cystoscopy today was negative.     The patient was advised that given a negative workup for microscopic hematuria we watch for resolution over the next 5 years. If the patients hematuria persists at the 5 year desire a repeat workup will be performed and as long as this is negative this is considered a non life threatening form of microscopic hematuria.     Patient will follow up with CNP annually to check UA.     All questions were answered to the patient's satisfaction. Patient agrees with the plan and wishes to proceed. Follow-up will be scheduled appropriately.       Scribed for Dr. Orellana by Myah Andrews. I , Dr Orellana, have personally reviewed and agreed with the information entered by the Virtual Scribe.

## 2025-02-27 ENCOUNTER — APPOINTMENT (OUTPATIENT)
Dept: CARDIOLOGY | Facility: CLINIC | Age: 75
End: 2025-02-27
Payer: MEDICARE

## 2025-03-03 ENCOUNTER — APPOINTMENT (OUTPATIENT)
Dept: CARDIOLOGY | Facility: CLINIC | Age: 75
End: 2025-03-03
Payer: MEDICARE

## 2025-03-03 VITALS
BODY MASS INDEX: 42.52 KG/M2 | RESPIRATION RATE: 18 BRPM | WEIGHT: 240 LBS | HEIGHT: 63 IN | OXYGEN SATURATION: 96 % | TEMPERATURE: 98.6 F | SYSTOLIC BLOOD PRESSURE: 146 MMHG | DIASTOLIC BLOOD PRESSURE: 88 MMHG | HEART RATE: 96 BPM

## 2025-03-03 DIAGNOSIS — I51.89 DIASTOLIC DYSFUNCTION: ICD-10-CM

## 2025-03-03 DIAGNOSIS — I50.33 ACUTE ON CHRONIC DIASTOLIC HEART FAILURE: ICD-10-CM

## 2025-03-03 DIAGNOSIS — I20.9 ANGINA PECTORIS: Primary | ICD-10-CM

## 2025-03-03 DIAGNOSIS — I10 PRIMARY HYPERTENSION: ICD-10-CM

## 2025-03-03 DIAGNOSIS — E78.5 DYSLIPIDEMIA: ICD-10-CM

## 2025-03-03 DIAGNOSIS — R07.9 CHEST PAIN, UNSPECIFIED TYPE: ICD-10-CM

## 2025-03-03 DIAGNOSIS — R06.09 DYSPNEA ON EXERTION: ICD-10-CM

## 2025-03-03 DIAGNOSIS — I77.819 AORTIC DILATATION (CMS-HCC): ICD-10-CM

## 2025-03-03 PROCEDURE — 3008F BODY MASS INDEX DOCD: CPT | Performed by: INTERNAL MEDICINE

## 2025-03-03 PROCEDURE — 3079F DIAST BP 80-89 MM HG: CPT | Performed by: INTERNAL MEDICINE

## 2025-03-03 PROCEDURE — 1159F MED LIST DOCD IN RCRD: CPT | Performed by: INTERNAL MEDICINE

## 2025-03-03 PROCEDURE — 99214 OFFICE O/P EST MOD 30 MIN: CPT | Performed by: INTERNAL MEDICINE

## 2025-03-03 PROCEDURE — 3077F SYST BP >= 140 MM HG: CPT | Performed by: INTERNAL MEDICINE

## 2025-03-03 PROCEDURE — 1160F RVW MEDS BY RX/DR IN RCRD: CPT | Performed by: INTERNAL MEDICINE

## 2025-03-03 PROCEDURE — 1126F AMNT PAIN NOTED NONE PRSNT: CPT | Performed by: INTERNAL MEDICINE

## 2025-03-03 RX ORDER — ACETAMINOPHEN AND PHENYLEPHRINE HCL 325; 5 MG/1; MG/1
1 TABLET ORAL
COMMUNITY

## 2025-03-03 RX ORDER — HYDRALAZINE HYDROCHLORIDE 25 MG/1
25 TABLET, FILM COATED ORAL 3 TIMES DAILY
Qty: 270 TABLET | Refills: 3 | Status: SHIPPED | OUTPATIENT
Start: 2025-03-03 | End: 2026-03-03

## 2025-03-03 ASSESSMENT — PATIENT HEALTH QUESTIONNAIRE - PHQ9
1. LITTLE INTEREST OR PLEASURE IN DOING THINGS: SEVERAL DAYS
SUM OF ALL RESPONSES TO PHQ9 QUESTIONS 1 AND 2: 2
10. IF YOU CHECKED OFF ANY PROBLEMS, HOW DIFFICULT HAVE THESE PROBLEMS MADE IT FOR YOU TO DO YOUR WORK, TAKE CARE OF THINGS AT HOME, OR GET ALONG WITH OTHER PEOPLE: SOMEWHAT DIFFICULT
2. FEELING DOWN, DEPRESSED OR HOPELESS: SEVERAL DAYS

## 2025-03-03 ASSESSMENT — ENCOUNTER SYMPTOMS
OCCASIONAL FEELINGS OF UNSTEADINESS: 0
DEPRESSION: 0
LOSS OF SENSATION IN FEET: 0

## 2025-03-03 ASSESSMENT — LIFESTYLE VARIABLES
HOW MANY STANDARD DRINKS CONTAINING ALCOHOL DO YOU HAVE ON A TYPICAL DAY: 1 OR 2
SKIP TO QUESTIONS 9-10: 1
HOW OFTEN DURING THE LAST YEAR HAVE YOU BEEN UNABLE TO REMEMBER WHAT HAPPENED THE NIGHT BEFORE BECAUSE YOU HAD BEEN DRINKING: NEVER
AUDIT-C TOTAL SCORE: 3
HOW OFTEN DO YOU HAVE A DRINK CONTAINING ALCOHOL: 2-3 TIMES A WEEK
HAS A RELATIVE, FRIEND, DOCTOR, OR ANOTHER HEALTH PROFESSIONAL EXPRESSED CONCERN ABOUT YOUR DRINKING OR SUGGESTED YOU CUT DOWN: NO
HOW OFTEN DURING THE LAST YEAR HAVE YOU FOUND THAT YOU WERE NOT ABLE TO STOP DRINKING ONCE YOU HAD STARTED: NEVER
HOW OFTEN DURING THE LAST YEAR HAVE YOU HAD A FEELING OF GUILT OR REMORSE AFTER DRINKING: NEVER
HOW OFTEN DO YOU HAVE SIX OR MORE DRINKS ON ONE OCCASION: NEVER
HOW OFTEN DURING THE LAST YEAR HAVE YOU FAILED TO DO WHAT WAS NORMALLY EXPECTED FROM YOU BECAUSE OF DRINKING: NEVER
HOW OFTEN DURING THE LAST YEAR HAVE YOU NEEDED AN ALCOHOLIC DRINK FIRST THING IN THE MORNING TO GET YOURSELF GOING AFTER A NIGHT OF HEAVY DRINKING: NEVER
HAVE YOU OR SOMEONE ELSE BEEN INJURED AS A RESULT OF YOUR DRINKING: NO
AUDIT TOTAL SCORE: 3

## 2025-03-03 ASSESSMENT — PAIN SCALES - GENERAL: PAINLEVEL_OUTOF10: 0-NO PAIN

## 2025-03-03 NOTE — PROGRESS NOTES
History of present illness:  This is a very pleasant 74-year-old female with history of left diaphragmatic paralysis, previous history of shortness of breath and chest pain with activity. 2 D echo in 2019 showed normal ejection fraction mild mitral regurgitation. Patient had a cardiac catheterization in 2020 showed no significant coronary artery disease. Patient had a stress test in December 2022 showed no ischemia back then it was workup for jaw pain.  Patient feeling overall good.  Denies having any chest pain or shortness of breath.  No palpitations dizziness lightheadedness or syncope.    Past Medical History:   Diagnosis Date    Acute on chronic diastolic heart failure 09/13/2023    Angina pectoris 09/13/2023    Aortic dilatation (CMS-HCC) 09/13/2023    Chest pain 09/13/2023    COPD (chronic obstructive pulmonary disease) (Multi)     Diastolic dysfunction 09/13/2023    Dyslipidemia 09/13/2023    Dyspnea on exertion 09/13/2023    Encounter for gynecological examination (general) (routine) without abnormal findings     Encounter for routine pelvic examination    Encounter for screening mammogram for malignant neoplasm of breast     Visit for screening mammogram    Essential hypertension 09/13/2023    High blood pressure     Hyperlipidemia 09/13/2023    Hyperlipidemia, unspecified 04/12/2018    Hyperlipidemia    Hyperlipidemia, unspecified     Dyslipidemia    Left arm pain     Left hand pain     Lymphedema     Morbid (severe) obesity due to excess calories (Multi)     Morbid obesity    Other conditions influencing health status     Osteoarthritis    Personal history of other diseases of the circulatory system     History of essential hypertension    Personal history of other diseases of the digestive system     History of esophageal reflux    Sleep apnea        Past Surgical History:   Procedure Laterality Date    CARDIAC PACEMAKER REMOVAL      COLONOSCOPY  06/07/2013    Complete Colonoscopy    OTHER SURGICAL  HISTORY  02/10/2014    Cornea Transplant       Allergies   Allergen Reactions    Ace Inhibitors Cough    Amlodipine Besylate Respiratory depression    Losartan Unknown        reports that she has quit smoking. Her smoking use included cigarettes. She has never been exposed to tobacco smoke. She has never used smokeless tobacco. She reports current alcohol use of about 1.0 standard drink of alcohol per week. She reports that she does not use drugs.    Family History   Problem Relation Name Age of Onset    Diabetes Mother      Heart disease Mother      Cancer Father      Cancer Sister      Breast cancer Mother's Sister      Breast cancer Other family history     Diabetes Other family history     Heart disease Other family history     Other (htn) Other family history        Patient's Medications   New Prescriptions    No medications on file   Previous Medications    ACETAMINOPHEN (TYLENOL EXTRA STRENGTH) 500 MG TABLET    Take 1 tablet (500 mg) by mouth every 6 hours.    BIOTIN 10,000 MCG CAPSULE    Take 1 capsule (10 mg) by mouth once daily.    CARVEDILOL (COREG) 12.5 MG TABLET    Take 1 tablet (12.5 mg) by mouth.    HYDRALAZINE (APRESOLINE) 25 MG TABLET    Take 1 tablet (25 mg) by mouth 2 times a day.    HYDROCHLOROTHIAZIDE (HYDRODIURIL) 25 MG TABLET    Take 1 tablet (25 mg) by mouth once daily.    LISINOPRIL-HYDROCHLOROTHIAZIDE 20-25 MG TABLET    Take 1 tablet by mouth once daily.    MELATONIN 10 MG CAPSULE    Take 1 capsule (10 mg) by mouth once daily at bedtime.    MULTIVITAMIN WITH MINERALS IRON-FREE (CENTRUM SILVER)    Take 1 tablet by mouth once daily.    NITROGLYCERIN (NITROSTAT) 0.4 MG SL TABLET    Place 1 tablet (0.4 mg) under the tongue every 5 minutes if needed for chest pain. as directed    OMEPRAZOLE (PRILOSEC) 20 MG DR CAPSULE    TAKE TWO CAPSULES BY MOUTH ONCE DAILY 30 MINUTES BEFORE MORNING MEAL    ROSUVASTATIN (CRESTOR) 40 MG TABLET    Take 1 tablet (40 mg) by mouth once daily at bedtime.     SPIRONOLACTONE (ALDACTONE) 25 MG TABLET    Take 1 tablet (25 mg) by mouth once daily.    TRELEGY ELLIPTA 100-62.5-25 MCG BLISTER WITH DEVICE       Modified Medications    No medications on file   Discontinued Medications    ADVAIR DISKUS 250-50 MCG/DOSE DISKUS INHALER    Inhale 1 puff every 12 hours.  RINSE AFTER EACH USE    ALBUTEROL 90 MCG/ACTUATION INHALER    Inhale 2 puffs every 4 hours if needed for shortness of breath.    AMLODIPINE (NORVASC) 10 MG TABLET    Take 1 tablet (10 mg) by mouth once daily.    AMLODIPINE (NORVASC) 5 MG TABLET    Take 1 tablet (5 mg) by mouth once daily.    ASPIRIN 81 MG EC TABLET    Aspirin EC 81 MG TBEC  Refills: 0 MD Te Napoles Start: 14-Nov-2022    CHLORHEXIDINE (PERIDEX) 0.12 % SOLUTION    Take by mouth. RINSE MOUTH WITH 1/2 OUNCE TWICE DAILY AFTER BREAKFAST AND BEFORE BEDTIME FOLLOWING BRUSHING AND FLOSSING    FLUTICASONE (FLONASE) 50 MCG/ACTUATION NASAL SPRAY        FUROSEMIDE (LASIX) 20 MG TABLET    Take 1 tablet (20 mg) by mouth once daily.    FUROSEMIDE (LASIX) 40 MG TABLET    Take 1 tablet (40 mg) by mouth every other day.    LISINOPRIL 30 MG TABLET    once every 24 hours.    LOSARTAN (COZAAR) 100 MG TABLET    Take 1 tablet (100 mg) by mouth once daily.    NAPROXEN (NAPROSYN) 500 MG TABLET    TAKE 1 TABLET BY MOUTH WITH FOOD TWICE A DAY    NYAMYC 100,000 UNIT/GRAM POWDER    Apply 1 Application topically 3 times a day. To rash    PRAVASTATIN (PRAVACHOL) 40 MG TABLET    Take 1 tablet (40 mg) by mouth once daily.       Objective   Physical Exam  General: Patient in no acute distress   HEENT: Atraumatic normocephalic.  Neck: Supple, jugular venous pressure within normal limit.  No bruits  Lungs: Clear to auscultation bilaterally  Cardiovascular: Regular rate and rhythm, normal heart sounds, no murmurs rubs or gallops  Abdomen: Soft nontender nondistended.  Normal bowel sounds.  Extremities: Warm to touch, no edema.    Lab Review   Procedure Visit on 01/08/2025    Component Date Value    POC Color, Urine 01/08/2025 Yellow     POC Appearance, Urine 01/08/2025 Clear     POC Glucose, Urine 01/08/2025 NEGATIVE     POC Bilirubin, Urine 01/08/2025 NEGATIVE     POC Ketones, Urine 01/08/2025 NEGATIVE     POC Specific Gravity, Ur* 01/08/2025 1.015     POC Blood, Urine 01/08/2025 SMALL (1+) (A)     POC PH, Urine 01/08/2025 5.5     POC Protein, Urine 01/08/2025 NEGATIVE     POC Urobilinogen, Urine 01/08/2025 0.2     Poc Nitrite, Urine 01/08/2025 NEGATIVE     POC Leukocytes, Urine 01/08/2025 MODERATE (2+) (A)         Assessment/Plan   Patient Active Problem List   Diagnosis    Acute kidney injury (CMS-HCC)    Acute on chronic diastolic heart failure    Age-related nuclear cataract of right eye    Aortic dilatation (CMS-HCC)    Asthma    Bilateral keratitis sicca    BPPV (benign paroxysmal positional vertigo)    Angina pectoris    Bursitis of hip    Chest pain    Greater trochanteric bursitis    Choroidal nevus, right eye    Chronic cough    Chronic renal impairment, stage 3 (moderate) (Multi)    Chronic renal insufficiency    Colon polyp    Degeneration of intervertebral disc of lumbar region    Thoracic degenerative disc disease    Depression with anxiety    Diaphragmatic paralysis    Diastolic dysfunction    Discoid lupus erythematosus of right eyelid    Vertigo    Dyslipidemia    Dyspnea on exertion    Arthritis    Eczema    Edema    Esophageal reflux    Esophageal spasm    Essential hypertension    Female cystocele    Gout    Hematochezia    Hemorrhoids    Herpes zoster    Hypercalcemia    Hyperkalemia    Hyperlipidemia    Hypoglycemia    Insomnia    Keratitis    Left carpal tunnel syndrome    Right carpal tunnel syndrome    Lesion of right eyelid    Lipoma    Lumbar back sprain    Lymphedema of both lower extremities    Morbid obesity (Multi)    Obesity hypoventilation syndrome (Multi)    Obstructive sleep apnea    Osteoarthritis of left knee    Osteoarthritis of right knee     Osteopenia    Ovarian cyst, complex    Primary hyperparathyroidism (Multi)    Localized osteoarthritis of left hand    Primary osteoarthritis, right hand    Recurrent major depressive disorder, in partial remission (CMS-HCC)    Seborrheic keratoses    Second degree uterine prolapse    Spondylosis of lumbar spine    Status post penetrating keratoplasty    Subjective visual disturbance of left eye    Swelling of left lower extremity    Syncope    Synovial cyst of left knee    Urinary hesitancy    Vitamin D deficiency    Vitreous floaters of both eyes    Weight loss    Bruch membrane dystrophy, left    Current smoker    Eye injury, penetrating, left, sequela    Left hand pain    Lipedema of lower extremity    Mild intermittent asthma without complication ([facility]-HCC)    Posterior vitreous detachment of right eye    Pseudophakia    Vitreous syneresis of both eyes    Left shoulder pain    Bursitis of left shoulder    Diaphragm dysfunction    Other microscopic hematuria      This is a very pleasant 74-year-old female with history of left diaphragmatic paralysis, previous history of shortness of breath and chest pain with activity. 2 D echo in 2019 showed normal ejection fraction mild mitral regurgitation. Patient had a cardiac catheterization in 2020 showed no significant coronary artery disease. Patient had a stress test in December 2022 showed no ischemia back then it was workup for jaw pain.  Patient feeling overall good.  Denies having any chest pain or shortness of breath.  No palpitations dizziness lightheadedness or syncope.    Patient has repeated lipid panel showing LDL very poorly controlled which has been very well-controlled in the past.  My feeling this could be overall value of his lipid panel.  Will repeat another lipid panel.  His blood pressure remains elevated I will start him on hydrochlorothiazide 25 mg oral daily.  Otherwise discussed with him lifestyle modification.  Will follow-up in 6  months.      Vianey Louise MD

## 2025-03-03 NOTE — PATIENT INSTRUCTIONS
Start hydralazine 25 mg twice daily if your blood pressure controlled with that then is good if not you may increase it to 3 times daily.  Target blood pressure less than 130 systolic and less than 80 diastolic.    Lets repeat another lipid panel to make sure that this is not lab error the finding on your LDL levels being that high.    I will see you in 6 months

## 2025-03-05 LAB
CHOLEST SERPL-MCNC: 132 MG/DL
CHOLEST/HDLC SERPL: 2.3 (CALC)
HDLC SERPL-MCNC: 58 MG/DL
LDLC SERPL CALC-MCNC: 57 MG/DL (CALC)
NONHDLC SERPL-MCNC: 74 MG/DL (CALC)
TRIGL SERPL-MCNC: 83 MG/DL

## 2025-03-24 ENCOUNTER — OFFICE VISIT (OUTPATIENT)
Dept: URGENT CARE | Age: 75
End: 2025-03-24
Payer: MEDICARE

## 2025-03-24 ENCOUNTER — ANCILLARY PROCEDURE (OUTPATIENT)
Dept: URGENT CARE | Age: 75
End: 2025-03-24
Payer: MEDICARE

## 2025-03-24 VITALS
SYSTOLIC BLOOD PRESSURE: 140 MMHG | BODY MASS INDEX: 42.52 KG/M2 | HEIGHT: 63 IN | RESPIRATION RATE: 16 BRPM | DIASTOLIC BLOOD PRESSURE: 86 MMHG | OXYGEN SATURATION: 99 % | TEMPERATURE: 97.9 F | HEART RATE: 65 BPM | WEIGHT: 240 LBS

## 2025-03-24 DIAGNOSIS — R07.81 RIB PAIN ON RIGHT SIDE: ICD-10-CM

## 2025-03-24 DIAGNOSIS — S20.211A CONTUSION OF RIB, RIGHT, INITIAL ENCOUNTER: Primary | ICD-10-CM

## 2025-03-24 PROCEDURE — 3079F DIAST BP 80-89 MM HG: CPT | Performed by: SURGERY

## 2025-03-24 PROCEDURE — 96372 THER/PROPH/DIAG INJ SC/IM: CPT | Performed by: SURGERY

## 2025-03-24 PROCEDURE — 1125F AMNT PAIN NOTED PAIN PRSNT: CPT | Performed by: SURGERY

## 2025-03-24 PROCEDURE — 1159F MED LIST DOCD IN RCRD: CPT | Performed by: SURGERY

## 2025-03-24 PROCEDURE — 99203 OFFICE O/P NEW LOW 30 MIN: CPT | Performed by: SURGERY

## 2025-03-24 PROCEDURE — 1036F TOBACCO NON-USER: CPT | Performed by: SURGERY

## 2025-03-24 PROCEDURE — 3008F BODY MASS INDEX DOCD: CPT | Performed by: SURGERY

## 2025-03-24 PROCEDURE — 1160F RVW MEDS BY RX/DR IN RCRD: CPT | Performed by: SURGERY

## 2025-03-24 PROCEDURE — 71101 X-RAY EXAM UNILAT RIBS/CHEST: CPT | Mod: RIGHT SIDE | Performed by: SURGERY

## 2025-03-24 PROCEDURE — 3077F SYST BP >= 140 MM HG: CPT | Performed by: SURGERY

## 2025-03-24 RX ORDER — KETOROLAC TROMETHAMINE 30 MG/ML
30 INJECTION, SOLUTION INTRAMUSCULAR; INTRAVENOUS ONCE
Status: COMPLETED | OUTPATIENT
Start: 2025-03-24 | End: 2025-03-24

## 2025-03-24 RX ORDER — OXYCODONE HYDROCHLORIDE 5 MG/1
5 TABLET ORAL EVERY 6 HOURS PRN
Qty: 15 TABLET | Refills: 0 | Status: SHIPPED | OUTPATIENT
Start: 2025-03-24 | End: 2025-03-31

## 2025-03-24 RX ORDER — PREDNISONE 20 MG/1
40 TABLET ORAL DAILY
Qty: 10 TABLET | Refills: 0 | Status: SHIPPED | OUTPATIENT
Start: 2025-03-24 | End: 2025-03-29

## 2025-03-24 RX ORDER — METHYLPREDNISOLONE SODIUM SUCCINATE 125 MG/2ML
125 INJECTION INTRAMUSCULAR; INTRAVENOUS ONCE
Status: COMPLETED | OUTPATIENT
Start: 2025-03-24 | End: 2025-03-24

## 2025-03-24 RX ADMIN — METHYLPREDNISOLONE SODIUM SUCCINATE 125 MG: 125 INJECTION INTRAMUSCULAR; INTRAVENOUS at 15:17

## 2025-03-24 RX ADMIN — KETOROLAC TROMETHAMINE 30 MG: 30 INJECTION, SOLUTION INTRAMUSCULAR; INTRAVENOUS at 15:17

## 2025-03-24 ASSESSMENT — PAIN SCALES - GENERAL: PAINLEVEL_OUTOF10: 10-WORST PAIN EVER

## 2025-03-24 NOTE — PROGRESS NOTES
Chief Complaint   Patient presents with    right rib cage pain     States she fell over 2 weeks ago and landed on right side       Physical Exam:     Lungs clear to auscultation bilaterally. No Splinting with deep breaths.     Tender to palpation over the anterolateral intercostal space 5-6    No Ecchymosis noted       Imaging:       === 03/24/25 ===    XR RIBS RIGHT 2 VIEWS WITH CHEST PA OR AP    - Impression -  No evidence of right rib fracture.    Signed by: Alessandro Woods 3/24/2025 2:36 PM  Dictation workstation:   [id number]    Assessment:     Encounter Diagnosis   Name Primary?    Contusion of rib, right, initial encounter Yes          Medical Decision Making & Plan:     In clinic: solumedrol + toradol     Rx: prednisone + oxycodone    OTC: Tylenol,     Heat             Home      03/24/25 at 3:51 PM - Anahy Stewart, DO

## 2025-03-24 NOTE — LETTER
March 24, 2025     Patient: Ondina Simmons   YOB: 1950   Date of Visit: 3/24/2025       To Whom It May Concern:    Ondina Simmons was seen in my clinic on 3/24/2025 at 1:40 pm. Please excuse Ondina for her absence from work until 4/21/2025    If you have any questions or concerns, please don't hesitate to call.         Sincerely,           Anahy Stewart,         CC: No Recipients

## 2025-03-24 NOTE — PATIENT INSTRUCTIONS
These injuries are very painful! Controlling your pain is very important so that you are able to take deep breaths. Taking deep breaths keeps us from getting pneumonia and helps us open our airways.     For pain:   1000 mg Tylenol every 8 hours    I have prescribed you a steroid, Prednisone, to help reduce swelling and inflammation. Do not take any medication that is labeled an NSAID, such as ibuprofen, Aleve, Motrin, aspirin.       If you develop shortness of breath, fever, worsening cough please return to our clinic or go to the ER for evaluation.       Follow up with your primary care doctor as needed.

## 2025-06-04 ENCOUNTER — APPOINTMENT (OUTPATIENT)
Dept: OPHTHALMOLOGY | Facility: CLINIC | Age: 75
End: 2025-06-04
Payer: MEDICARE

## 2025-06-12 ENCOUNTER — APPOINTMENT (OUTPATIENT)
Dept: NEPHROLOGY | Facility: CLINIC | Age: 75
End: 2025-06-12
Payer: MEDICARE

## 2025-06-12 VITALS
OXYGEN SATURATION: 90 % | SYSTOLIC BLOOD PRESSURE: 126 MMHG | HEART RATE: 95 BPM | TEMPERATURE: 97.8 F | DIASTOLIC BLOOD PRESSURE: 69 MMHG | BODY MASS INDEX: 43.4 KG/M2 | WEIGHT: 245 LBS

## 2025-06-12 DIAGNOSIS — I10 ESSENTIAL HYPERTENSION: ICD-10-CM

## 2025-06-12 DIAGNOSIS — E78.5 HYPERLIPIDEMIA, UNSPECIFIED HYPERLIPIDEMIA TYPE: ICD-10-CM

## 2025-06-12 DIAGNOSIS — E66.2 OBESITY HYPOVENTILATION SYNDROME (MULTI): ICD-10-CM

## 2025-06-12 DIAGNOSIS — E66.01 MORBID OBESITY (MULTI): ICD-10-CM

## 2025-06-12 DIAGNOSIS — N18.31 CHRONIC RENAL IMPAIRMENT, STAGE 3A (MULTI): Primary | ICD-10-CM

## 2025-06-12 PROCEDURE — 1126F AMNT PAIN NOTED NONE PRSNT: CPT | Performed by: INTERNAL MEDICINE

## 2025-06-12 PROCEDURE — 1160F RVW MEDS BY RX/DR IN RCRD: CPT | Performed by: INTERNAL MEDICINE

## 2025-06-12 PROCEDURE — 1036F TOBACCO NON-USER: CPT | Performed by: INTERNAL MEDICINE

## 2025-06-12 PROCEDURE — 1159F MED LIST DOCD IN RCRD: CPT | Performed by: INTERNAL MEDICINE

## 2025-06-12 PROCEDURE — 3078F DIAST BP <80 MM HG: CPT | Performed by: INTERNAL MEDICINE

## 2025-06-12 PROCEDURE — 3074F SYST BP LT 130 MM HG: CPT | Performed by: INTERNAL MEDICINE

## 2025-06-12 PROCEDURE — 99214 OFFICE O/P EST MOD 30 MIN: CPT | Performed by: INTERNAL MEDICINE

## 2025-06-12 ASSESSMENT — PAIN SCALES - GENERAL: PAINLEVEL_OUTOF10: 0-NO PAIN

## 2025-06-12 NOTE — PROGRESS NOTES
Subjective   Ondina Simmons is a 74 y.o. female who presented today to discuss her stage 3a chronic kidney disease of unclear etiology.  She looks good today and denies nausea, vomiting, chest pain, abdominal pain.  She has shortness of breath on exertion but that is not a new issue.    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  She has obstructive sleep apnea on CPAP, history of gout, had a tonsillectomy.    Her blood pressures average in the 120s systolic, I reviewed her home data.    ROS  As in Subjective, all other ROS are negative    Objective     Vital signs    Visit Vitals  /69   Pulse 95   Temp 36.6 °C (97.8 °F) (Tympanic)      Vitals:    06/12/25 1021   Weight: 111 kg (245 lb)        Physical Exam  Constitutional:       Appearance: Normal appearance.   HENT:      Mouth/Throat:      Mouth: Mucous membranes are moist.   Eyes:      Extraocular Movements: Extraocular movements intact.      Pupils: Pupils are equal, round, and reactive to light.   Cardiovascular:      Rate and Rhythm: Regular rhythm. Sys murmur     Heart sounds: S1 normal and S2 normal.   Pulmonary:      Breath sounds: Normal breath sounds.   Abdominal:      Comments: Soft, NT/ND, no masses, normal bowel sounds   Genitourinary:     Comments: No lawrence  Musculoskeletal:      No synovitis  Edema:  2+ edema, unchanged  Skin:     General: Skin is warm and dry.   Neurological:      General: No focal deficit present.      Mental Status: She is alert and oriented to person, place, and time.   Psychiatric:         Behavior: Behavior normal.      Meds  Current Medications[1]     Allergies  RX Allergies[2]     Results  Lab Results   Component Value Date    GLUCOSE 106 (H) 11/11/2024     11/11/2024    K 4.3 11/11/2024     11/11/2024    CO2 28 11/11/2024    ANIONGAP 14 11/11/2024    BUN 21 11/11/2024    CREATININE 1.09 (H) 11/11/2024    GFRF 69 09/23/2023    CALCIUM 9.7 11/11/2024    MG 2.10  "11/14/2022     Lab Results   Component Value Date    PTH 89.4 (H) 10/22/2020    CALCIUM 9.7 11/11/2024     No results found for: \"ALBUR\", \"[id number]\"         @LABALLVALUEIP(CREATININE:*)@  @LABALLVALUEIP(NA:*)@    Imaging results  === 12/23/24 ===    US RENAL COMPLETE    - Impression -  2.0 x 2.1 x 2.6 cm simple left renal cyst with unremarkable  appearance of right kidney.    Small amount of debris within urinary bladder.    MACRO:  None.    Signed by: Sallie Rai 12/25/2024 8:51 PM  Dictation workstation:   [id number]     Assessment and Plan  Ondina Simmons has stable stage 3a chronic kidney disease of unclear etiology.  Back in November creatinine stable at 1.09 mg/dL.  LDL cholesterol was high, she is now on rosuvastatin and the LDL cholesterol was at goal this past March.  Iron stores are marginal last September, 25 vitamin D was also marginal but she is on repletion.  She had a normal CBC, has had blood in the urine but was cleared by Dr. Orellana.  I will do genetic testing to make certain that I do not miss a collagen 4 genetic issue.  I reviewed her last renal ultrasound, simple cysts on the left, no findings that would warrant a repeat study.       Problem List Items Addressed This Visit       Chronic renal impairment, stage 3 (moderate) (Multi) - Primary    Relevant Orders    Follow Up In Nephrology    CBC and Auto Differential    Renal Function Panel    Albumin-Creatinine Ratio, Urine Random    Creatinine, Urine Random    Protein, Urine Random    Urinalysis with Reflex Microscopic    Essential hypertension    Relevant Orders    Follow Up In Nephrology    CBC and Auto Differential    Renal Function Panel    Albumin-Creatinine Ratio, Urine Random    Creatinine, Urine Random    Protein, Urine Random    Urinalysis with Reflex Microscopic    Hyperlipidemia    Relevant Orders    Follow Up In Nephrology    CBC and Auto Differential    Renal Function Panel    Albumin-Creatinine Ratio, Urine Random    " Creatinine, Urine Random    Protein, Urine Random    Urinalysis with Reflex Microscopic      Fito Cardenas MD           [1]   Current Outpatient Medications:     acetaminophen (Tylenol Extra Strength) 500 mg tablet, Take 1 tablet (500 mg) by mouth every 6 hours., Disp: , Rfl:     biotin 10,000 mcg capsule, Take 1 capsule (10 mg) by mouth once daily., Disp: , Rfl:     carvedilol (Coreg) 12.5 mg tablet, Take 1 tablet (12.5 mg) by mouth 2 times a day., Disp: 180 tablet, Rfl: 3    hydrALAZINE (Apresoline) 25 mg tablet, Take 1 tablet (25 mg) by mouth 3 times a day., Disp: 270 tablet, Rfl: 3    hydroCHLOROthiazide (HYDRODiuril) 25 mg tablet, Take 1 tablet (25 mg) by mouth once daily., Disp: , Rfl:     multivitamin with minerals iron-free (Centrum Silver), Take 1 tablet by mouth once daily., Disp: , Rfl:     nitroglycerin (Nitrostat) 0.4 mg SL tablet, Place 1 tablet (0.4 mg) under the tongue every 5 minutes if needed for chest pain. as directed, Disp: 90 tablet, Rfl: 3    omeprazole (PriLOSEC) 20 mg DR capsule, TAKE TWO CAPSULES BY MOUTH ONCE DAILY 30 MINUTES BEFORE MORNING MEAL, Disp: , Rfl:     rosuvastatin (Crestor) 40 mg tablet, Take 1 tablet (40 mg) by mouth once daily at bedtime., Disp: 30 tablet, Rfl: 0    Trelegy Ellipta 100-62.5-25 mcg blister with device, , Disp: , Rfl:   [2]   Allergies  Allergen Reactions    Ace Inhibitors Cough    Amlodipine Besylate Respiratory depression    Losartan Unknown

## 2025-06-16 ENCOUNTER — APPOINTMENT (OUTPATIENT)
Dept: RADIOLOGY | Facility: CLINIC | Age: 75
End: 2025-06-16
Payer: MEDICARE

## 2025-06-16 ENCOUNTER — APPOINTMENT (OUTPATIENT)
Dept: OPHTHALMOLOGY | Facility: CLINIC | Age: 75
End: 2025-06-16
Payer: MEDICARE

## 2025-06-16 DIAGNOSIS — H25.11 AGE-RELATED NUCLEAR CATARACT OF RIGHT EYE: ICD-10-CM

## 2025-06-16 DIAGNOSIS — H40.2221 CHRONIC ANGLE-CLOSURE GLAUCOMA, MILD STAGE, LEFT: Primary | ICD-10-CM

## 2025-06-16 DIAGNOSIS — H16.223 BILATERAL KERATITIS SICCA: ICD-10-CM

## 2025-06-16 PROCEDURE — 92083 EXTENDED VISUAL FIELD XM: CPT | Performed by: OPHTHALMOLOGY

## 2025-06-16 PROCEDURE — 99213 OFFICE O/P EST LOW 20 MIN: CPT | Performed by: OPHTHALMOLOGY

## 2025-06-16 ASSESSMENT — CUP TO DISC RATIO
OS_RATIO: .45
OD_RATIO: .3

## 2025-06-16 ASSESSMENT — ENCOUNTER SYMPTOMS
ENDOCRINE NEGATIVE: 0
NEUROLOGICAL NEGATIVE: 0
MUSCULOSKELETAL NEGATIVE: 0
HEMATOLOGIC/LYMPHATIC NEGATIVE: 0
EYES NEGATIVE: 1
ALLERGIC/IMMUNOLOGIC NEGATIVE: 0
GASTROINTESTINAL NEGATIVE: 0
RESPIRATORY NEGATIVE: 0
PSYCHIATRIC NEGATIVE: 0
CONSTITUTIONAL NEGATIVE: 0
CARDIOVASCULAR NEGATIVE: 0

## 2025-06-16 ASSESSMENT — VISUAL ACUITY
OS_CC+: +1
CORRECTION_TYPE: GLASSES
OS_CC: 20/30
OD_CC+: +2
OD_CC: 20/25
METHOD: SNELLEN - LINEAR

## 2025-06-16 ASSESSMENT — REFRACTION_WEARINGRX
OS_ADD: +3.00
OS_CYLINDER: -5.75
OS_AXIS: 030
OD_SPHERE: -1.75
OS_SPHERE: +2.25
OD_ADD: +3.00

## 2025-06-16 ASSESSMENT — CONF VISUAL FIELD
OD_NORMAL: 1
OS_SUPERIOR_TEMPORAL_RESTRICTION: 0
OD_INFERIOR_TEMPORAL_RESTRICTION: 0
OD_SUPERIOR_TEMPORAL_RESTRICTION: 0
OS_INFERIOR_TEMPORAL_RESTRICTION: 0
OS_SUPERIOR_NASAL_RESTRICTION: 0
OS_INFERIOR_NASAL_RESTRICTION: 0
OS_NORMAL: 1
OD_SUPERIOR_NASAL_RESTRICTION: 0
OD_INFERIOR_NASAL_RESTRICTION: 0

## 2025-06-16 ASSESSMENT — PACHYMETRY
OS_CT(UM): 623
OD_CT(UM): 556

## 2025-06-16 ASSESSMENT — TONOMETRY
IOP_METHOD: GOLDMANN APPLANATION
OD_IOP_MMHG: 18
OS_IOP_MMHG: 19

## 2025-06-16 ASSESSMENT — SLIT LAMP EXAM - LIDS
COMMENTS: GOOD POSITION
COMMENTS: GOOD POSITION

## 2025-06-16 ASSESSMENT — EXTERNAL EXAM - LEFT EYE: OS_EXAM: NORMAL

## 2025-06-16 ASSESSMENT — EXTERNAL EXAM - RIGHT EYE: OD_EXAM: NORMAL

## 2025-06-16 NOTE — PROGRESS NOTES
Dry eye/blepharitis, both eyes:     Rec AT 4-6x daily, can also dry AT Gel     Lacrilube nightly     Unable to afford restasis             hx of trauma OS:     s/p PK     s/p secondary IOL     with irregular iris     doing great     continue mrx with poly carb lenses,  monocular precautions          chronic angle closure, left eye     IOP normal     pach thicker     OCT RNFL 3/6/23 WNL and symmetric    Herzog Visual Field - OU - Both Eyes          Normal OD  Rim artifact OS             monitor for now     rtc 6 months bAT/dfe/oct mac/oct rnfl/lens star          cataract OD: not visually signifcant. monitor

## 2025-06-18 NOTE — PROGRESS NOTES
Subjective      Chief Complaint   Patient presents with    Left Hand - Pain    Right Hand - Pain        Past Surgical History:   Procedure Laterality Date    CARDIAC PACEMAKER REMOVAL      COLONOSCOPY  06/07/2013    Complete Colonoscopy    OTHER SURGICAL HISTORY  02/10/2014    Cornea Transplant        HPI  This 73 year old patient presents today with complaints of bilateral hand pain which she rates at 8/10. The patient states that the bilateral hand pain has been present for months. The patient denies trauma or injury. The patient states that the bilateral hand pain is worse with and aggravated by repetitive activity and grasping.   She had good relief with previous cortisone into the left hand at the base of the first metacarpal.  The patient states that this hand pain is disabling and presents today to discuss further options. She has complaints of intermittent tingling in both hands. The patient states that they have tried tylenol and ibuprofen with no relief. Additionally, she is complaining of left shoulder pain (7-8/10) that has flared up recently and is worse with and aggravated by attempting to move, reach and lift with her left shoulder. I last treated her for her left shoulder pain in 3/2024 and performed a cortisone injection into her left subacromial bursa with good relief.     CARDIOLOGY:   Negative for chest pain, shortness of breath.   RESPIRATORY:   Negative for chest pain, shortness of breath.   MUSCULOSKELETAL:   See HPI for details.   NEUROLOGY:   Negative for tingling, numbness, weakness.    Objective      There were no vitals taken for this visit.     SHOULDER EXAM  Constitutional: Appears stated age. No apparent distress  Labored Breathing: No  Psychiatric: Normal mood and effect.   Neurological: alert and oriented x3  Skin: intact  HEENT: No bruising, otorrhea, rhinorrhea.  MUSCULOSKELETAL: Neck: No tenderness. No pain or limitation with range of motion. Back: No tenderness. Straight leg test  negative bilaterally.  Left hand: There is pain and crepitus with range of motion at the left base of the left thumb.  Phalen's is negative.  Finkelstein's is negative.  Neurovascular is intact to light touch.  left shoulder: There is tenderness anteriorly and laterally. Active abduction and active flexion are 0- 95 degrees but with rapid's, pain and guarding. There is pain with and limitation of active and passive internal and external rotation. Comparments are soft. Neurovascular is intact.     AP and lateral x-rays of the shoulder done and read in the office today show that there is superior migration of the left humeral head consistent with left rotator cuff arthropathy.  These x-rays do not show any evidence of acute fracture or bone destruction.    AP and lateral x-rays of the left hand done and read in the office today show osteoarthritis at the carpometacarpal joints of the left and right thumb with loss of joint surface cartilage and sclerosis.  I reviewed these x-rays and the x-rays listed above with the patient in the office today..    Patient ID: Ondina Simmons is a 74 y.o. female.    S Inj/Asp: bilateral thumb CMC on 6/20/2025 11:28 AM  Indications: pain  Details: 22 G needle, radial approach  Medications (Right): 1 mL triamcinolone acetonide 40 mg/mL; 1 mL lidocaine 10 mg/mL (1 %)  Medications (Left): 1 mL lidocaine 10 mg/mL (1 %); 1 mL triamcinolone acetonide 40 mg/mL  Outcome: tolerated well, no immediate complications  Procedure, treatment alternatives, risks and benefits explained, specific risks discussed. Consent was given by the patient. Immediately prior to procedure a time out was called to verify the correct patient, procedure, equipment, support staff and site/side marked as required. Patient was prepped and draped in the usual sterile fashion.       L Inj/Asp: L subacromial bursa on 6/20/2025 11:31 AM  Indications: pain  Details: 22 G needle  Medications: 1 mL triamcinolone acetonide 40  mg/mL; 1 mL lidocaine 10 mg/mL (1 %)  Outcome: tolerated well, no immediate complications  Procedure, treatment alternatives, risks and benefits explained, specific risks discussed. Consent was given by the patient. Immediately prior to procedure a time out was called to verify the correct patient, procedure, equipment, support staff and site/side marked as required.           Ondina was seen today for pain and pain.  Diagnoses and all orders for this visit:  Lumbar and sacral osteoarthritis (Primary)  -     Cancel: Referral to Pain Management; Future  -     Referral to Pain Management; Future  Left hand pain  -     XR hand 3+ views bilateral; Future  Localized osteoarthritis of left hand  -     S Inj/Asp: bilateral thumb CMC  Right hand pain  -     XR hand 3+ views bilateral; Future  Chronic left shoulder pain  -     XR shoulder left 2+ views; Future  DJD (degenerative joint disease), lumbosacral  -     Cancel: Referral to Pain Management; Future  -     Referral to Pain Management; Future  Secondary osteoarthritis of left shoulder due to rotator cuff arthropathy  Other orders  -     Cancel: S Inj/Asp  -     Cancel: Hand / UE Inj/Asp  -     Cancel: L Inj/Asp  -     L Inj/Asp: L subacromial bursa    Options are discussed with the patient in detail. The patient is instructed regarding activity modification, ice, physician directed at home gentle strengthening and ROM exercises, and the appropriate use of Tylenol as needed for pain with its potential adverse reactions and side effects. The patient understands. The patient states that despite all the treatment listed above that this left shoulder pain and left and right hand pain is debilitating and  requests a discussion of further options. Cortisone injection to the left shoulder and the left and right hand at the base of first metacarpal is discussed in the office today. This is done in the office today. See procedures. Return as needed, Please note that this report  has been produced using speech recognition software.  It may contain errors related to grammar, punctuation or spelling.  Electronically signed, but not reviewed.       Alexi Paulino MD

## 2025-06-20 ENCOUNTER — HOSPITAL ENCOUNTER (OUTPATIENT)
Dept: RADIOLOGY | Facility: CLINIC | Age: 75
Discharge: HOME | End: 2025-06-20
Payer: MEDICARE

## 2025-06-20 ENCOUNTER — OFFICE VISIT (OUTPATIENT)
Dept: ORTHOPEDIC SURGERY | Facility: CLINIC | Age: 75
End: 2025-06-20
Payer: MEDICARE

## 2025-06-20 VITALS — BODY MASS INDEX: 43.41 KG/M2 | HEIGHT: 63 IN | WEIGHT: 245 LBS

## 2025-06-20 DIAGNOSIS — G89.29 CHRONIC LEFT SHOULDER PAIN: ICD-10-CM

## 2025-06-20 DIAGNOSIS — M25.512 CHRONIC LEFT SHOULDER PAIN: ICD-10-CM

## 2025-06-20 DIAGNOSIS — M19.042 LOCALIZED OSTEOARTHRITIS OF LEFT HAND: ICD-10-CM

## 2025-06-20 DIAGNOSIS — M79.642 LEFT HAND PAIN: ICD-10-CM

## 2025-06-20 DIAGNOSIS — M47.817 LUMBAR AND SACRAL OSTEOARTHRITIS: Primary | ICD-10-CM

## 2025-06-20 DIAGNOSIS — M19.212 SECONDARY OSTEOARTHRITIS OF LEFT SHOULDER DUE TO ROTATOR CUFF ARTHROPATHY: ICD-10-CM

## 2025-06-20 DIAGNOSIS — M79.641 RIGHT HAND PAIN: ICD-10-CM

## 2025-06-20 DIAGNOSIS — M47.817 DJD (DEGENERATIVE JOINT DISEASE), LUMBOSACRAL: ICD-10-CM

## 2025-06-20 PROCEDURE — 73030 X-RAY EXAM OF SHOULDER: CPT | Mod: LT

## 2025-06-20 PROCEDURE — 20600 DRAIN/INJ JOINT/BURSA W/O US: CPT | Mod: 50 | Performed by: ORTHOPAEDIC SURGERY

## 2025-06-20 PROCEDURE — 99213 OFFICE O/P EST LOW 20 MIN: CPT | Performed by: ORTHOPAEDIC SURGERY

## 2025-06-20 PROCEDURE — 1160F RVW MEDS BY RX/DR IN RCRD: CPT | Performed by: ORTHOPAEDIC SURGERY

## 2025-06-20 PROCEDURE — 1036F TOBACCO NON-USER: CPT | Performed by: ORTHOPAEDIC SURGERY

## 2025-06-20 PROCEDURE — 3008F BODY MASS INDEX DOCD: CPT | Performed by: ORTHOPAEDIC SURGERY

## 2025-06-20 PROCEDURE — 20610 DRAIN/INJ JOINT/BURSA W/O US: CPT | Mod: LT | Performed by: ORTHOPAEDIC SURGERY

## 2025-06-20 PROCEDURE — 73030 X-RAY EXAM OF SHOULDER: CPT | Mod: LEFT SIDE | Performed by: ORTHOPAEDIC SURGERY

## 2025-06-20 PROCEDURE — 73130 X-RAY EXAM OF HAND: CPT | Mod: BILATERAL PROCEDURE | Performed by: ORTHOPAEDIC SURGERY

## 2025-06-20 PROCEDURE — 2500000004 HC RX 250 GENERAL PHARMACY W/ HCPCS (ALT 636 FOR OP/ED): Performed by: ORTHOPAEDIC SURGERY

## 2025-06-20 PROCEDURE — 73130 X-RAY EXAM OF HAND: CPT | Mod: 50

## 2025-06-20 PROCEDURE — 1125F AMNT PAIN NOTED PAIN PRSNT: CPT | Performed by: ORTHOPAEDIC SURGERY

## 2025-06-20 PROCEDURE — 1159F MED LIST DOCD IN RCRD: CPT | Performed by: ORTHOPAEDIC SURGERY

## 2025-06-20 RX ORDER — LIDOCAINE HYDROCHLORIDE 10 MG/ML
1 INJECTION, SOLUTION INFILTRATION; PERINEURAL
Status: COMPLETED | OUTPATIENT
Start: 2025-06-20 | End: 2025-06-20

## 2025-06-20 RX ORDER — TRIAMCINOLONE ACETONIDE 40 MG/ML
1 INJECTION, SUSPENSION INTRA-ARTICULAR; INTRAMUSCULAR
Status: COMPLETED | OUTPATIENT
Start: 2025-06-20 | End: 2025-06-20

## 2025-06-20 RX ADMIN — LIDOCAINE HYDROCHLORIDE 1 ML: 10 INJECTION, SOLUTION INFILTRATION; PERINEURAL at 11:28

## 2025-06-20 RX ADMIN — TRIAMCINOLONE ACETONIDE 1 ML: 400 INJECTION, SUSPENSION INTRA-ARTICULAR; INTRAMUSCULAR at 11:31

## 2025-06-20 RX ADMIN — TRIAMCINOLONE ACETONIDE 1 ML: 400 INJECTION, SUSPENSION INTRA-ARTICULAR; INTRAMUSCULAR at 11:28

## 2025-06-20 RX ADMIN — LIDOCAINE HYDROCHLORIDE 1 ML: 10 INJECTION, SOLUTION INFILTRATION; PERINEURAL at 11:31

## 2025-06-20 ASSESSMENT — PAIN DESCRIPTION - DESCRIPTORS: DESCRIPTORS: ACHING

## 2025-06-20 ASSESSMENT — LIFESTYLE VARIABLES
AUDIT TOTAL SCORE: 1
HOW OFTEN DURING THE LAST YEAR HAVE YOU BEEN UNABLE TO REMEMBER WHAT HAPPENED THE NIGHT BEFORE BECAUSE YOU HAD BEEN DRINKING: NEVER
HAS A RELATIVE, FRIEND, DOCTOR, OR ANOTHER HEALTH PROFESSIONAL EXPRESSED CONCERN ABOUT YOUR DRINKING OR SUGGESTED YOU CUT DOWN: NO
HAVE YOU OR SOMEONE ELSE BEEN INJURED AS A RESULT OF YOUR DRINKING: NO
HOW OFTEN DURING THE LAST YEAR HAVE YOU FAILED TO DO WHAT WAS NORMALLY EXPECTED FROM YOU BECAUSE OF DRINKING: NEVER
HOW MANY STANDARD DRINKS CONTAINING ALCOHOL DO YOU HAVE ON A TYPICAL DAY: 1 OR 2
HOW OFTEN DO YOU HAVE SIX OR MORE DRINKS ON ONE OCCASION: NEVER
HOW OFTEN DO YOU HAVE A DRINK CONTAINING ALCOHOL: MONTHLY OR LESS
AUDIT-C TOTAL SCORE: 1
HOW OFTEN DURING THE LAST YEAR HAVE YOU HAD A FEELING OF GUILT OR REMORSE AFTER DRINKING: NEVER
HOW OFTEN DURING THE LAST YEAR HAVE YOU NEEDED AN ALCOHOLIC DRINK FIRST THING IN THE MORNING TO GET YOURSELF GOING AFTER A NIGHT OF HEAVY DRINKING: NEVER
HOW OFTEN DURING THE LAST YEAR HAVE YOU FOUND THAT YOU WERE NOT ABLE TO STOP DRINKING ONCE YOU HAD STARTED: NEVER
SKIP TO QUESTIONS 9-10: 1

## 2025-06-20 ASSESSMENT — PAIN SCALES - GENERAL
PAINLEVEL_OUTOF10: 8
PAINLEVEL_OUTOF10: 8

## 2025-06-20 ASSESSMENT — ENCOUNTER SYMPTOMS
OCCASIONAL FEELINGS OF UNSTEADINESS: 0
DEPRESSION: 0
LOSS OF SENSATION IN FEET: 0

## 2025-06-20 ASSESSMENT — COLUMBIA-SUICIDE SEVERITY RATING SCALE - C-SSRS
1. IN THE PAST MONTH, HAVE YOU WISHED YOU WERE DEAD OR WISHED YOU COULD GO TO SLEEP AND NOT WAKE UP?: NO
6. HAVE YOU EVER DONE ANYTHING, STARTED TO DO ANYTHING, OR PREPARED TO DO ANYTHING TO END YOUR LIFE?: NO
2. HAVE YOU ACTUALLY HAD ANY THOUGHTS OF KILLING YOURSELF?: NO

## 2025-06-20 ASSESSMENT — PAIN - FUNCTIONAL ASSESSMENT: PAIN_FUNCTIONAL_ASSESSMENT: 0-10

## 2025-07-10 ENCOUNTER — APPOINTMENT (OUTPATIENT)
Dept: OPHTHALMOLOGY | Facility: CLINIC | Age: 75
End: 2025-07-10
Payer: MEDICARE

## 2025-07-14 ENCOUNTER — APPOINTMENT (OUTPATIENT)
Dept: RADIOLOGY | Facility: CLINIC | Age: 75
End: 2025-07-14
Payer: MEDICARE

## 2025-07-14 DIAGNOSIS — Z78.0 ASYMPTOMATIC MENOPAUSAL STATE: ICD-10-CM

## 2025-07-14 PROCEDURE — 77080 DXA BONE DENSITY AXIAL: CPT | Performed by: RADIOLOGY

## 2025-07-14 PROCEDURE — 77080 DXA BONE DENSITY AXIAL: CPT

## 2025-07-23 ENCOUNTER — OFFICE VISIT (OUTPATIENT)
Dept: PAIN MEDICINE | Facility: CLINIC | Age: 75
End: 2025-07-23
Payer: MEDICARE

## 2025-07-23 VITALS — OXYGEN SATURATION: 97 % | HEART RATE: 72 BPM | DIASTOLIC BLOOD PRESSURE: 72 MMHG | SYSTOLIC BLOOD PRESSURE: 136 MMHG

## 2025-07-23 DIAGNOSIS — M54.16 LUMBAR RADICULITIS: Primary | ICD-10-CM

## 2025-07-23 PROCEDURE — 99204 OFFICE O/P NEW MOD 45 MIN: CPT | Performed by: ANESTHESIOLOGY

## 2025-07-23 PROCEDURE — 1159F MED LIST DOCD IN RCRD: CPT | Performed by: ANESTHESIOLOGY

## 2025-07-23 PROCEDURE — G2211 COMPLEX E/M VISIT ADD ON: HCPCS | Performed by: ANESTHESIOLOGY

## 2025-07-23 PROCEDURE — 3078F DIAST BP <80 MM HG: CPT | Performed by: ANESTHESIOLOGY

## 2025-07-23 PROCEDURE — 99214 OFFICE O/P EST MOD 30 MIN: CPT | Performed by: ANESTHESIOLOGY

## 2025-07-23 PROCEDURE — 1160F RVW MEDS BY RX/DR IN RCRD: CPT | Performed by: ANESTHESIOLOGY

## 2025-07-23 PROCEDURE — 1125F AMNT PAIN NOTED PAIN PRSNT: CPT | Performed by: ANESTHESIOLOGY

## 2025-07-23 PROCEDURE — 3075F SYST BP GE 130 - 139MM HG: CPT | Performed by: ANESTHESIOLOGY

## 2025-07-23 RX ORDER — CYCLOBENZAPRINE HCL 5 MG
5 TABLET ORAL 3 TIMES DAILY PRN
Qty: 90 TABLET | Refills: 0 | Status: SHIPPED | OUTPATIENT
Start: 2025-07-23 | End: 2025-08-22

## 2025-07-23 RX ORDER — DULOXETIN HYDROCHLORIDE 30 MG/1
CAPSULE, DELAYED RELEASE ORAL
Qty: 60 CAPSULE | Refills: 0 | Status: SHIPPED | OUTPATIENT
Start: 2025-07-23

## 2025-07-23 ASSESSMENT — ENCOUNTER SYMPTOMS
CARDIOVASCULAR NEGATIVE: 1
GASTROINTESTINAL NEGATIVE: 1
OCCASIONAL FEELINGS OF UNSTEADINESS: 0
LOSS OF SENSATION IN FEET: 0
BACK PAIN: 1
PSYCHIATRIC NEGATIVE: 1
ENDOCRINE NEGATIVE: 1
HEMATOLOGIC/LYMPHATIC NEGATIVE: 1
EYES NEGATIVE: 1
CONSTITUTIONAL NEGATIVE: 1
RESPIRATORY NEGATIVE: 1
DEPRESSION: 0

## 2025-07-23 ASSESSMENT — PAIN - FUNCTIONAL ASSESSMENT: PAIN_FUNCTIONAL_ASSESSMENT: 0-10

## 2025-07-23 ASSESSMENT — PATIENT HEALTH QUESTIONNAIRE - PHQ9
SUM OF ALL RESPONSES TO PHQ9 QUESTIONS 1 AND 2: 0
1. LITTLE INTEREST OR PLEASURE IN DOING THINGS: NOT AT ALL
2. FEELING DOWN, DEPRESSED OR HOPELESS: NOT AT ALL

## 2025-07-23 ASSESSMENT — LIFESTYLE VARIABLES: TOTAL SCORE: 1

## 2025-07-23 ASSESSMENT — PAIN SCALES - GENERAL
PAINLEVEL_OUTOF10: 1
PAINLEVEL_OUTOF10: 1

## 2025-07-23 ASSESSMENT — PAIN DESCRIPTION - DESCRIPTORS: DESCRIPTORS: SORE;SQUEEZING

## 2025-07-23 NOTE — PROGRESS NOTES
PAIN MANAGEMENT NEW PATIENT OFFICE NOTE    Date of Service: 7/23/2025    SUBJECTIVE    CHIEF COMPLAINT: LBP    HISTORY OF PRESENT ILLNESS    Ondina Simmons is a 74 y.o. 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    Pt describes LB pain since 2022 without inciting trauma/incident. Pain radiates to R hip and buttock. Pain is worse with activity including standing, walking. Pain limits function, ADLs, impairing QoL. Sleeping well. Pain is alleviated with sitting, laying down. Pt has tried rest, Tylenol, ibuprofen, Excedrin, >6 w PT without sustained relief. Would like to avoid NSAIDs 2/2 CKD    Pt denies new-onset numbness, weakness, bowel/bladder incontinence.  Pt denies recent infection, allergy to Latex/iodine/contrast. Patient is currently taking the following blood thinner(s): N/A    REVIEW OF SYSTEMS  Review of Systems   Constitutional: Negative.    HENT: Negative.     Eyes: Negative.    Respiratory: Negative.     Cardiovascular: Negative.    Gastrointestinal: Negative.    Endocrine: Negative.    Musculoskeletal:  Positive for back pain and gait problem.   Skin: Negative.    Hematological: Negative.    Psychiatric/Behavioral: Negative.         PAST MEDICAL HISTORY  Medical History[1]  Surgical History[2]  Family History[3]    CURRENT MEDICATIONS  Current Medications[4]    ALLERGIES AND DRUG REACTIONS  Allergies[5]       OBJECTIVE  Visit Vitals  /72   Pulse 72   LMP  (LMP Unknown)   SpO2 97%   OB Status Postmenopausal   Smoking Status Former       Last Recorded Pain Score (if available):           Pain Score:   1     Physical Exam  Vitals and nursing note reviewed.       General: Sitting in chair, NAD  Head: NCAT  Eyes: Sclera/conjunctiva clear, EOMI, PERRL  Nose/mouth: MMM  CV: Good distal pulses  Lungs: Good/equal chest  excursion  Abdomen: Soft, ND  Ext: No cyanosis/edema  MSK: L-spine alignment: unremarkable, BL paraspinal m TTP, L-spine ROM: extension assoc with pain    Neuro: AAOx3, CN grossly nl  Dermatome sensation to light touch  LEFT L1 (lower pelvis/upper thigh): WNL    RIGHT L1: WNL      LEFT L2 (upper thigh): WNL       RIGHT: L2:WNL      LEFT L3 (medial knee): WNL       RIGHT L3: WNL      Decreased in sock fashion BL    Motor strength  LEFT L2 (hip flexion): 5/5   RIGHT L2: 5/5  LEFT L3 (knee extension): 5/5     RIGHT L3: 5/5  LEFT L4 (dorsiflexion): 5/5     RIGHT L4: 5/5  LEFT L5 (EHL extension): 5/5     RIGHT L5: 5/5  LEFT S1 (plantarflexion): 5/5     RIGHT S1: 5/5  LEFT S2 (knee flexion): 5/5      RIGHT S2: 5/5    Special testing  DTR diminished patellar BL  Seated slump test neg BL  Clonus: neg BL  Babinski: neg BL    Psych: affect nl  Skin: no rash/lesions      REVIEW OF LABORATORY DATA  I have reviewed the following lab results:  WBC   Date Value Ref Range Status   11/11/2024 5.6 4.4 - 11.3 x10*3/uL Final     RBC   Date Value Ref Range Status   11/11/2024 4.35 4.00 - 5.20 x10*6/uL Final     Hemoglobin   Date Value Ref Range Status   11/11/2024 13.0 12.0 - 16.0 g/dL Final     Hematocrit   Date Value Ref Range Status   11/11/2024 40.6 36.0 - 46.0 % Final     MCV   Date Value Ref Range Status   11/11/2024 93 80 - 100 fL Final     MCH   Date Value Ref Range Status   11/11/2024 29.9 26.0 - 34.0 pg Final     MCHC   Date Value Ref Range Status   11/11/2024 32.0 32.0 - 36.0 g/dL Final     RDW   Date Value Ref Range Status   11/11/2024 13.3 11.5 - 14.5 % Final     Platelets   Date Value Ref Range Status   11/11/2024 284 150 - 450 x10*3/uL Final     MPV   Date Value Ref Range Status   01/22/2023 9.8 7.0 - 12.6 CU Final     Sodium   Date Value Ref Range Status   11/11/2024 145 136 - 145 mmol/L Final     Potassium   Date Value Ref Range Status   11/11/2024 4.3 3.5 - 5.3 mmol/L Final     Bicarbonate   Date Value Ref Range Status    11/11/2024 28 21 - 32 mmol/L Final     Urea Nitrogen   Date Value Ref Range Status   11/11/2024 21 6 - 23 mg/dL Final     Calcium   Date Value Ref Range Status   11/11/2024 9.7 8.6 - 10.6 mg/dL Final     Protime   Date Value Ref Range Status   01/24/2020 10.9 9.3 - 12.7 SEC Final     INR   Date Value Ref Range Status   01/24/2020 1.0 0.86 - 1.16 Final     Comment:     INR Therapeutic Range: 2.0-3.5  Performed at [address] [address]           REVIEW OF RADIOLOGY   I have reviewed the following:  Radiology Studies           XR L-spine 12/20/24:  Counting reference: Lumbosacral junction.  For the purposes of this rreport,  L4-5 is considered the level of the iliac crest and assume there  are 5 lumbar-type vertebrae.  Anatomic variant:  None.     Vertebral body heights are maintained.  Grade 1 anterolisthesis L4 on L5.    Severe multilevel degenerative disc disease.  Multilevel lower lumbar facet hypertrophy.  No dynamic instability        ASSESSMENT & PLAN  Ondina HAMILTON Davysophie is a 74 y.o. 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    1) LBP  -Since 2022 with radiation to R hip/buttock w/o focal neurological deficit  -Refractive to yrs of conservative tx including rest, Tylenol, ibuprofen, Excedrin, >6 w PT. Declines NSAIDs 2/2 CKD  -MRI L-spine to eval neuraxial dz and guide next steps  -Duloxetine 60 mg every day titration, cyclobenzaprine 5 mg TID PRN  -RTC 4-6 w          Today's visit involved establishment of care and a complete examination with review of records. In the context of the complexity of this patient's chronic pain diagnosis, long-term expectations and care planning discussed. Imaging studies ordered are placed do elucidate the patient's diagnosis, but also to evaluate the patient's candidacy for  procedural and surgical interventions. The risks and benefits of these potential interventions are detailed as above.         Heidy Araujo MD  Anesthesiologist & Interventional Pain Physician   [facility]  O: [phone]  F: [phone]  9:37 AM  07/23/25         [1]   Past Medical History:  Diagnosis Date    Acute on chronic diastolic heart failure 09/13/2023    Angina pectoris 09/13/2023    Aortic dilatation 09/13/2023    Chest pain 09/13/2023    COPD (chronic obstructive pulmonary disease) (Multi)     Diastolic dysfunction 09/13/2023    Dyslipidemia 09/13/2023    Dyspnea on exertion 09/13/2023    Encounter for gynecological examination (general) (routine) without abnormal findings     Encounter for routine pelvic examination    Encounter for screening mammogram for malignant neoplasm of breast     Visit for screening mammogram    Essential hypertension 09/13/2023    High blood pressure     Hyperlipidemia 09/13/2023    Hyperlipidemia, unspecified 04/12/2018    Hyperlipidemia    Hyperlipidemia, unspecified     Dyslipidemia    Left arm pain     Left hand pain     Lymphedema     Morbid (severe) obesity due to excess calories (Multi)     Morbid obesity    Other conditions influencing health status     Osteoarthritis    Personal history of other diseases of the circulatory system     History of essential hypertension    Personal history of other diseases of the digestive system     History of esophageal reflux    Sleep apnea    [2]   Past Surgical History:  Procedure Laterality Date    CARDIAC PACEMAKER REMOVAL      COLONOSCOPY  06/07/2013    Complete Colonoscopy    OTHER SURGICAL HISTORY  02/10/2014    Cornea Transplant   [3]   Family History  Problem Relation Name Age of Onset    Diabetes Mother      Heart disease Mother      Cancer Father      Cancer Sister      Breast cancer Mother's Sister      Breast cancer Other family history     Diabetes Other family history     Heart disease Other family  history     Other (htn) Other family history    [4]   Current Outpatient Medications   Medication Sig Dispense Refill    acetaminophen (Tylenol Extra Strength) 500 mg tablet Take 1 tablet (500 mg) by mouth every 6 hours.      carvedilol (Coreg) 12.5 mg tablet Take 1 tablet (12.5 mg) by mouth 2 times a day. 180 tablet 3    hydrALAZINE (Apresoline) 25 mg tablet Take 1 tablet (25 mg) by mouth 3 times a day. 270 tablet 3    hydroCHLOROthiazide (HYDRODiuril) 25 mg tablet Take 1 tablet (25 mg) by mouth once daily.      multivitamin with minerals iron-free (Centrum Silver) Take 1 tablet by mouth once daily.      nitroglycerin (Nitrostat) 0.4 mg SL tablet Place 1 tablet (0.4 mg) under the tongue every 5 minutes if needed for chest pain. as directed 90 tablet 3    omeprazole (PriLOSEC) 20 mg DR capsule       rosuvastatin (Crestor) 40 mg tablet Take 1 tablet (40 mg) by mouth once daily at bedtime. 30 tablet 0    Trelegy Ellipta 100-62.5-25 mcg blister with device        No current facility-administered medications for this visit.   [5]   Allergies  Allergen Reactions    Ace Inhibitors Cough    Amlodipine Besylate Respiratory depression    Losartan Unknown

## 2025-07-23 NOTE — LETTER
July 23, 2025     Alexi Paulino MD  [id number]  [address]  [facility] [address]    Patient: Ondina Simmons   YOB: 1950   Date of Visit: 7/23/2025       Dear Dr. Alexi Paulino MD:    Thank you for referring Ondina Simmons to me for evaluation. Below are my notes for this consultation.  If you have questions, please do not hesitate to call me. I look forward to following your patient along with you.       Sincerely,     Heidy Araujo MD      CC: No Recipients  ______________________________________________________________________________________    PAIN MANAGEMENT NEW PATIENT OFFICE NOTE    Date of Service: 7/23/2025    SUBJECTIVE    CHIEF COMPLAINT: LBP    HISTORY OF PRESENT ILLNESS    Ondina Simmons is a 74 y.o. 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    Pt describes LB pain since 2022 without inciting trauma/incident. Pain radiates to R hip and buttock. Pain is worse with activity including standing, walking. Pain limits function, ADLs, impairing QoL. Sleeping well. Pain is alleviated with sitting, laying down. Pt has tried rest, Tylenol, ibuprofen, Excedrin, >6 w PT without sustained relief. Would like to avoid NSAIDs 2/2 CKD    Pt denies new-onset numbness, weakness, bowel/bladder incontinence.  Pt denies recent infection, allergy to Latex/iodine/contrast. Patient is currently taking the following blood thinner(s): N/A    REVIEW OF SYSTEMS  Review of Systems   Constitutional: Negative.    HENT: Negative.     Eyes: Negative.    Respiratory: Negative.     Cardiovascular: Negative.    Gastrointestinal: Negative.    Endocrine: Negative.    Musculoskeletal:  Positive for back pain and gait problem.   Skin: Negative.    Hematological: Negative.    Psychiatric/Behavioral: Negative.         PAST  MEDICAL HISTORY  Medical History[1]  Surgical History[2]  Family History[3]    CURRENT MEDICATIONS  Current Medications[4]    ALLERGIES AND DRUG REACTIONS  Allergies[5]       OBJECTIVE  Visit Vitals  /72   Pulse 72   LMP  (LMP Unknown)   SpO2 97%   OB Status Postmenopausal   Smoking Status Former       Last Recorded Pain Score (if available):           Pain Score:   1     Physical Exam  Vitals and nursing note reviewed.       General: Sitting in chair, NAD  Head: NCAT  Eyes: Sclera/conjunctiva clear, EOMI, PERRL  Nose/mouth: MMM  CV: Good distal pulses  Lungs: Good/equal chest excursion  Abdomen: Soft, ND  Ext: No cyanosis/edema  MSK: L-spine alignment: unremarkable, BL paraspinal m TTP, L-spine ROM: extension assoc with pain    Neuro: AAOx3, CN grossly nl  Dermatome sensation to light touch  LEFT L1 (lower pelvis/upper thigh): WNL    RIGHT L1: WNL      LEFT L2 (upper thigh): WNL       RIGHT: L2:WNL      LEFT L3 (medial knee): WNL       RIGHT L3: WNL      Decreased in sock fashion BL    Motor strength  LEFT L2 (hip flexion): 5/5   RIGHT L2: 5/5  LEFT L3 (knee extension): 5/5     RIGHT L3: 5/5  LEFT L4 (dorsiflexion): 5/5     RIGHT L4: 5/5  LEFT L5 (EHL extension): 5/5     RIGHT L5: 5/5  LEFT S1 (plantarflexion): 5/5     RIGHT S1: 5/5  LEFT S2 (knee flexion): 5/5      RIGHT S2: 5/5    Special testing  DTR diminished patellar BL  Seated slump test neg BL  Clonus: neg BL  Babinski: neg BL    Psych: affect nl  Skin: no rash/lesions      REVIEW OF LABORATORY DATA  I have reviewed the following lab results:  WBC   Date Value Ref Range Status   11/11/2024 5.6 4.4 - 11.3 x10*3/uL Final     RBC   Date Value Ref Range Status   11/11/2024 4.35 4.00 - 5.20 x10*6/uL Final     Hemoglobin   Date Value Ref Range Status   11/11/2024 13.0 12.0 - 16.0 g/dL Final     Hematocrit   Date Value Ref Range Status   11/11/2024 40.6 36.0 - 46.0 % Final     MCV   Date Value Ref Range Status   11/11/2024 93 80 - 100 fL Final     MCH   Date  Value Ref Range Status   11/11/2024 29.9 26.0 - 34.0 pg Final     MCHC   Date Value Ref Range Status   11/11/2024 32.0 32.0 - 36.0 g/dL Final     RDW   Date Value Ref Range Status   11/11/2024 13.3 11.5 - 14.5 % Final     Platelets   Date Value Ref Range Status   11/11/2024 284 150 - 450 x10*3/uL Final     MPV   Date Value Ref Range Status   01/22/2023 9.8 7.0 - 12.6 CU Final     Sodium   Date Value Ref Range Status   11/11/2024 145 136 - 145 mmol/L Final     Potassium   Date Value Ref Range Status   11/11/2024 4.3 3.5 - 5.3 mmol/L Final     Bicarbonate   Date Value Ref Range Status   11/11/2024 28 21 - 32 mmol/L Final     Urea Nitrogen   Date Value Ref Range Status   11/11/2024 21 6 - 23 mg/dL Final     Calcium   Date Value Ref Range Status   11/11/2024 9.7 8.6 - 10.6 mg/dL Final     Protime   Date Value Ref Range Status   01/24/2020 10.9 9.3 - 12.7 SEC Final     INR   Date Value Ref Range Status   01/24/2020 1.0 0.86 - 1.16 Final     Comment:     INR Therapeutic Range: 2.0-3.5  Performed at [address] [address]           REVIEW OF RADIOLOGY   I have reviewed the following:  Radiology Studies           XR L-spine 12/20/24:  Counting reference: Lumbosacral junction.  For the purposes of this rreport,  L4-5 is considered the level of the iliac crest and assume there  are 5 lumbar-type vertebrae.  Anatomic variant:  None.     Vertebral body heights are maintained.  Grade 1 anterolisthesis L4 on L5.    Severe multilevel degenerative disc disease.  Multilevel lower lumbar facet hypertrophy.  No dynamic instability        ASSESSMENT & PLAN  Ondina Simmons is a 74 y.o. 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    1) LBP  -Since 2022 with radiation to R hip/buttock w/o focal neurological deficit  -Refractive to yrs  of conservative tx including rest, Tylenol, ibuprofen, Excedrin, >6 w PT. Declines NSAIDs 2/2 CKD  -MRI L-spine to eval neuraxial dz and guide next steps  -Duloxetine 60 mg every day titration, cyclobenzaprine 5 mg TID PRN  -RTC 4-6 w          Today's visit involved establishment of care and a complete examination with review of records. In the context of the complexity of this patient's chronic pain diagnosis, long-term expectations and care planning discussed. Imaging studies ordered are placed do elucidate the patient's diagnosis, but also to evaluate the patient's candidacy for procedural and surgical interventions. The risks and benefits of these potential interventions are detailed as above.         Heidy Araujo MD  Anesthesiologist & Interventional Pain Physician   [facility]  O: [phone]  F: [phone]  9:37 AM  07/23/25         [1]  Past Medical History:  Diagnosis Date   • Acute on chronic diastolic heart failure 09/13/2023   • Angina pectoris 09/13/2023   • Aortic dilatation 09/13/2023   • Chest pain 09/13/2023   • COPD (chronic obstructive pulmonary disease) (Multi)    • Diastolic dysfunction 09/13/2023   • Dyslipidemia 09/13/2023   • Dyspnea on exertion 09/13/2023   • Encounter for gynecological examination (general) (routine) without abnormal findings     Encounter for routine pelvic examination   • Encounter for screening mammogram for malignant neoplasm of breast     Visit for screening mammogram   • Essential hypertension 09/13/2023   • High blood pressure    • Hyperlipidemia 09/13/2023   • Hyperlipidemia, unspecified 04/12/2018    Hyperlipidemia   • Hyperlipidemia, unspecified     Dyslipidemia   • Left arm pain    • Left hand pain    • Lymphedema    • Morbid (severe) obesity due to excess calories (Multi)     Morbid obesity   • Other conditions influencing health status     Osteoarthritis   • Personal history of other diseases of the circulatory system     History of  essential hypertension   • Personal history of other diseases of the digestive system     History of esophageal reflux   • Sleep apnea    [2]  Past Surgical History:  Procedure Laterality Date   • CARDIAC PACEMAKER REMOVAL     • COLONOSCOPY  06/07/2013    Complete Colonoscopy   • OTHER SURGICAL HISTORY  02/10/2014    Cornea Transplant   [3]  Family History  Problem Relation Name Age of Onset   • Diabetes Mother     • Heart disease Mother     • Cancer Father     • Cancer Sister     • Breast cancer Mother's Sister     • Breast cancer Other family history    • Diabetes Other family history    • Heart disease Other family history    • Other (htn) Other family history    [4]  Current Outpatient Medications   Medication Sig Dispense Refill   • acetaminophen (Tylenol Extra Strength) 500 mg tablet Take 1 tablet (500 mg) by mouth every 6 hours.     • carvedilol (Coreg) 12.5 mg tablet Take 1 tablet (12.5 mg) by mouth 2 times a day. 180 tablet 3   • hydrALAZINE (Apresoline) 25 mg tablet Take 1 tablet (25 mg) by mouth 3 times a day. 270 tablet 3   • hydroCHLOROthiazide (HYDRODiuril) 25 mg tablet Take 1 tablet (25 mg) by mouth once daily.     • multivitamin with minerals iron-free (Centrum Silver) Take 1 tablet by mouth once daily.     • nitroglycerin (Nitrostat) 0.4 mg SL tablet Place 1 tablet (0.4 mg) under the tongue every 5 minutes if needed for chest pain. as directed 90 tablet 3   • omeprazole (PriLOSEC) 20 mg DR capsule      • rosuvastatin (Crestor) 40 mg tablet Take 1 tablet (40 mg) by mouth once daily at bedtime. 30 tablet 0   • Trelegy Ellipta 100-62.5-25 mcg blister with device        No current facility-administered medications for this visit.   [5]  Allergies  Allergen Reactions   • Ace Inhibitors Cough   • Amlodipine Besylate Respiratory depression   • Losartan Unknown          [1]  Past Medical History:  Diagnosis Date   • Acute on chronic diastolic heart failure 09/13/2023   • Angina pectoris 09/13/2023   •  Aortic dilatation 09/13/2023   • Chest pain 09/13/2023   • COPD (chronic obstructive pulmonary disease) (Multi)    • Diastolic dysfunction 09/13/2023   • Dyslipidemia 09/13/2023   • Dyspnea on exertion 09/13/2023   • Encounter for gynecological examination (general) (routine) without abnormal findings     Encounter for routine pelvic examination   • Encounter for screening mammogram for malignant neoplasm of breast     Visit for screening mammogram   • Essential hypertension 09/13/2023   • High blood pressure    • Hyperlipidemia 09/13/2023   • Hyperlipidemia, unspecified 04/12/2018    Hyperlipidemia   • Hyperlipidemia, unspecified     Dyslipidemia   • Left arm pain    • Left hand pain    • Lymphedema    • Morbid (severe) obesity due to excess calories (Multi)     Morbid obesity   • Other conditions influencing health status     Osteoarthritis   • Personal history of other diseases of the circulatory system     History of essential hypertension   • Personal history of other diseases of the digestive system     History of esophageal reflux   • Sleep apnea    [2]  Past Surgical History:  Procedure Laterality Date   • CARDIAC PACEMAKER REMOVAL     • COLONOSCOPY  06/07/2013    Complete Colonoscopy   • OTHER SURGICAL HISTORY  02/10/2014    Cornea Transplant   [3]  Family History  Problem Relation Name Age of Onset   • Diabetes Mother     • Heart disease Mother     • Cancer Father     • Cancer Sister     • Breast cancer Mother's Sister     • Breast cancer Other family history    • Diabetes Other family history    • Heart disease Other family history    • Other (htn) Other family history    [4]  Current Outpatient Medications   Medication Sig Dispense Refill   • acetaminophen (Tylenol Extra Strength) 500 mg tablet Take 1 tablet (500 mg) by mouth every 6 hours.     • carvedilol (Coreg) 12.5 mg tablet Take 1 tablet (12.5 mg) by mouth 2 times a day. 180 tablet 3   • hydrALAZINE (Apresoline) 25 mg tablet Take 1 tablet (25 mg)  by mouth 3 times a day. 270 tablet 3   • hydroCHLOROthiazide (HYDRODiuril) 25 mg tablet Take 1 tablet (25 mg) by mouth once daily.     • multivitamin with minerals iron-free (Centrum Silver) Take 1 tablet by mouth once daily.     • nitroglycerin (Nitrostat) 0.4 mg SL tablet Place 1 tablet (0.4 mg) under the tongue every 5 minutes if needed for chest pain. as directed 90 tablet 3   • omeprazole (PriLOSEC) 20 mg DR capsule      • rosuvastatin (Crestor) 40 mg tablet Take 1 tablet (40 mg) by mouth once daily at bedtime. 30 tablet 0   • Trelegy Ellipta 100-62.5-25 mcg blister with device        No current facility-administered medications for this visit.   [5]  Allergies  Allergen Reactions   • Ace Inhibitors Cough   • Amlodipine Besylate Respiratory depression   • Losartan Unknown

## 2025-07-28 ENCOUNTER — TELEPHONE (OUTPATIENT)
Dept: PAIN MEDICINE | Facility: CLINIC | Age: 75
End: 2025-07-28
Payer: MEDICARE

## 2025-07-28 NOTE — TELEPHONE ENCOUNTER
"Pt calls stating the duloxetine and cyclobenzaprine cause too much drowsiness and \"I can't work. I called off work today. \"  She states it really helps her pain but she is \"afraid I will fall asleep at work.\"  "

## 2025-08-08 ENCOUNTER — HOSPITAL ENCOUNTER (OUTPATIENT)
Dept: RADIOLOGY | Facility: CLINIC | Age: 75
Discharge: HOME | End: 2025-08-08
Payer: MEDICARE

## 2025-08-08 DIAGNOSIS — M54.16 LUMBAR RADICULITIS: ICD-10-CM

## 2025-08-08 PROCEDURE — 72148 MRI LUMBAR SPINE W/O DYE: CPT

## 2025-09-05 ENCOUNTER — APPOINTMENT (OUTPATIENT)
Dept: PAIN MEDICINE | Facility: CLINIC | Age: 75
End: 2025-09-05
Payer: MEDICARE

## 2025-09-08 ENCOUNTER — APPOINTMENT (OUTPATIENT)
Dept: OTOLARYNGOLOGY | Facility: CLINIC | Age: 75
End: 2025-09-08
Payer: MEDICARE

## 2025-09-08 ENCOUNTER — APPOINTMENT (OUTPATIENT)
Facility: CLINIC | Age: 75
End: 2025-09-08
Payer: MEDICARE

## 2025-09-08 ENCOUNTER — APPOINTMENT (OUTPATIENT)
Dept: AUDIOLOGY | Facility: CLINIC | Age: 75
End: 2025-09-08
Payer: MEDICARE

## 2025-10-09 ENCOUNTER — APPOINTMENT (OUTPATIENT)
Dept: NEPHROLOGY | Facility: CLINIC | Age: 75
End: 2025-10-09
Payer: MEDICARE

## 2025-11-13 ENCOUNTER — APPOINTMENT (OUTPATIENT)
Dept: OPHTHALMOLOGY | Facility: CLINIC | Age: 75
End: 2025-11-13
Payer: MEDICARE

## 2025-12-17 ENCOUNTER — APPOINTMENT (OUTPATIENT)
Dept: OPHTHALMOLOGY | Facility: CLINIC | Age: 75
End: 2025-12-17
Payer: MEDICARE

## 2026-01-13 ENCOUNTER — APPOINTMENT (OUTPATIENT)
Dept: UROLOGY | Facility: CLINIC | Age: 76
End: 2026-01-13
Payer: MEDICARE

## (undated) DEVICE — TUBE SET, PNEUMOCLEAR, SMOKE EVACU, HIGH-FLOW

## (undated) DEVICE — CONTAINER, SPECIMEN, 120 ML, STERILE

## (undated) DEVICE — Device

## (undated) DEVICE — TROCAR, BLUNTPORT,  W/THREADED ANCHOR, 12MM, LF

## (undated) DEVICE — ENDO, PORT VERSASTEP 5MM

## (undated) DEVICE — SCISSORS, EPIX, LAPOROSCOPIC, 5MM X 35CM

## (undated) DEVICE — NEEDLE, INSUFFLATION, 14 G, 100 MM

## (undated) DEVICE — TOWEL, SURGICAL, NEURO, O/R, 16 X 26, BLUE, STERILE

## (undated) DEVICE — PAD, GROUNDING, ELECTROSURGICAL, W/9 FT CABLE, POLYHESIVE II, ADULT, LF

## (undated) DEVICE — SUTURE, VICRYL, 0, 27 IN, UR-6, VIOLET

## (undated) DEVICE — REST, HEAD, BAGEL, 9 IN

## (undated) DEVICE — SPONGE GAUZE, XRAY SC+RFID, 4X4 16 PLY, STERILE

## (undated) DEVICE — SUTURE, VICRYL, 4-0, 18 IN, UNDYED BR PS-2

## (undated) DEVICE — COVER, CART, 45 X 27 X 48 IN, CLEAR